# Patient Record
Sex: FEMALE | Race: WHITE | NOT HISPANIC OR LATINO | Employment: OTHER | URBAN - METROPOLITAN AREA
[De-identification: names, ages, dates, MRNs, and addresses within clinical notes are randomized per-mention and may not be internally consistent; named-entity substitution may affect disease eponyms.]

---

## 2017-02-06 ENCOUNTER — PATIENT MESSAGE (OUTPATIENT)
Dept: FAMILY MEDICINE | Facility: CLINIC | Age: 57
End: 2017-02-06

## 2017-02-08 ENCOUNTER — TELEPHONE (OUTPATIENT)
Dept: FAMILY MEDICINE | Facility: CLINIC | Age: 57
End: 2017-02-08

## 2017-02-08 ENCOUNTER — OFFICE VISIT (OUTPATIENT)
Dept: FAMILY MEDICINE | Facility: CLINIC | Age: 57
End: 2017-02-08
Payer: MEDICARE

## 2017-02-08 VITALS
BODY MASS INDEX: 26.35 KG/M2 | WEIGHT: 167.88 LBS | HEART RATE: 88 BPM | DIASTOLIC BLOOD PRESSURE: 88 MMHG | TEMPERATURE: 98 F | OXYGEN SATURATION: 98 % | HEIGHT: 67 IN | SYSTOLIC BLOOD PRESSURE: 110 MMHG

## 2017-02-08 DIAGNOSIS — F90.9 ATTENTION DEFICIT HYPERACTIVITY DISORDER (ADHD), UNSPECIFIED ADHD TYPE: ICD-10-CM

## 2017-02-08 DIAGNOSIS — Z12.11 SCREEN FOR COLON CANCER: Primary | ICD-10-CM

## 2017-02-08 DIAGNOSIS — K21.9 GASTROESOPHAGEAL REFLUX DISEASE WITHOUT ESOPHAGITIS: ICD-10-CM

## 2017-02-08 PROCEDURE — 99499 UNLISTED E&M SERVICE: CPT | Mod: S$GLB,,, | Performed by: FAMILY MEDICINE

## 2017-02-08 PROCEDURE — 99214 OFFICE O/P EST MOD 30 MIN: CPT | Mod: S$GLB,,, | Performed by: FAMILY MEDICINE

## 2017-02-08 PROCEDURE — 99999 PR PBB SHADOW E&M-EST. PATIENT-LVL III: CPT | Mod: PBBFAC,,, | Performed by: FAMILY MEDICINE

## 2017-02-08 RX ORDER — PANTOPRAZOLE SODIUM 20 MG/1
20 TABLET, DELAYED RELEASE ORAL DAILY
Qty: 90 TABLET | Refills: 1 | Status: SHIPPED | OUTPATIENT
Start: 2017-02-08 | End: 2018-03-15 | Stop reason: SDUPTHER

## 2017-02-08 RX ORDER — PANTOPRAZOLE SODIUM 40 MG/1
40 TABLET, DELAYED RELEASE ORAL DAILY
Qty: 30 TABLET | Refills: 5 | Status: CANCELLED | OUTPATIENT
Start: 2017-02-08

## 2017-02-08 RX ORDER — DEXTROAMPHETAMINE SACCHARATE, AMPHETAMINE ASPARTATE, DEXTROAMPHETAMINE SULFATE AND AMPHETAMINE SULFATE 3.75; 3.75; 3.75; 3.75 MG/1; MG/1; MG/1; MG/1
15 TABLET ORAL 2 TIMES DAILY
Qty: 60 TABLET | Refills: 0 | Status: SHIPPED | OUTPATIENT
Start: 2017-02-08 | End: 2017-04-12

## 2017-02-08 NOTE — PROGRESS NOTES
Subjective:       Patient ID: Nena Craig is a 56 y.o. female.    Chief Complaint: Medication Refill    HPI   Ms. Craig is a 55 yo female w/ a PMH of ADHD, hypothyroidism, anxiety, and trigeminal neuralgia who presented this AM for a routine f/u.  Pt c/o worsening of her GERD symptoms.  She requested to be placed back on prescription-strength pantoprazole, since she has been out of it for over a month now.  Additionally, she stated she is having occasional, severe HA associated w/ her trigeminal neuralgia. She is being followed by neurology.  Needs cheaper alternative to adderall xr while the patient assistance paperwork is processed for vyvanse.    Pt is otherwise well, overdue health maintenance was discussed and pt expressed understanding.    Review of Systems   Constitutional: Negative for activity change, appetite change, chills, diaphoresis, fatigue, fever and unexpected weight change.   HENT: Positive for sinus pressure. Negative for congestion, ear discharge, ear pain, facial swelling, postnasal drip, rhinorrhea, sore throat, tinnitus and trouble swallowing.    Eyes: Negative for photophobia, pain, discharge, redness, itching and visual disturbance.   Respiratory: Negative for apnea, cough, choking, chest tightness, shortness of breath, wheezing and stridor.    Cardiovascular: Negative for chest pain, palpitations and leg swelling.   Gastrointestinal: Positive for constipation. Negative for abdominal distention, abdominal pain, blood in stool, diarrhea, nausea and vomiting.   Genitourinary: Negative for difficulty urinating, dysuria, flank pain, hematuria and urgency.   Musculoskeletal: Negative for arthralgias, back pain, gait problem, joint swelling and myalgias.   Skin: Negative for color change, pallor, rash and wound.   Neurological: Positive for headaches. Negative for dizziness, facial asymmetry, speech difficulty, weakness, light-headedness and numbness.   Psychiatric/Behavioral: Negative for  agitation, behavioral problems, confusion, decreased concentration and dysphoric mood. The patient is not nervous/anxious.        Objective:      Physical Exam   Constitutional: She is oriented to person, place, and time. She appears well-developed and well-nourished. No distress.   HENT:   Head: Normocephalic and atraumatic.   Eyes: Conjunctivae and EOM are normal. Pupils are equal, round, and reactive to light.   Neck: Normal range of motion. Neck supple.   Cardiovascular: Normal rate, regular rhythm, normal heart sounds and intact distal pulses.    Pulmonary/Chest: Effort normal. No respiratory distress. She has no wheezes. She has no rales. She exhibits no tenderness.   Musculoskeletal: Normal range of motion. She exhibits no edema or deformity.   Neurological: She is alert and oriented to person, place, and time. No cranial nerve deficit.   Skin: Skin is warm and dry. She is not diaphoretic.   Psychiatric: She has a normal mood and affect. Her behavior is normal. Thought content normal.   Nursing note and vitals reviewed.        Screen for colon cancer  -     Case request GI: COLONOSCOPY    Gastroesophageal reflux disease without esophagitis  -     pantoprazole (PROTONIX) 20 MG tablet; Take 1 tablet (20 mg total) by mouth once daily.  Dispense: 90 tablet; Refill: 1  Trial at 20mg dose, if not sufficiently effective, can increase to 40mg daily.  Attention deficit hyperactivity disorder (ADHD), unspecified ADHD type  -     dextroamphetamine-amphetamine (ADDERALL) 15 mg tablet; Take 1 tablet (15 mg total) by mouth 2 (two) times daily.  Dispense: 60 tablet; Refill: 0  Side effects and precautions of medication use reviewed with patient, expressed understanding. No questions or concerns.     Risks, benefits, and limitations of all medications and interventions were discussed, pt expressed understanding.

## 2017-02-08 NOTE — TELEPHONE ENCOUNTER
----- Message from Jackelyn Graves sent at 2/8/2017 11:14 AM CST -----  Contact: [phone]  Patient called asking if the rx Adderall would be called into the pharmacy- [facility] on file.Thanks!

## 2017-02-22 ENCOUNTER — TELEPHONE (OUTPATIENT)
Dept: GASTROENTEROLOGY | Facility: CLINIC | Age: 57
End: 2017-02-22

## 2017-03-21 DIAGNOSIS — E03.4 HYPOTHYROIDISM DUE TO ACQUIRED ATROPHY OF THYROID: ICD-10-CM

## 2017-03-21 RX ORDER — LEVOTHYROXINE SODIUM 88 UG/1
88 TABLET ORAL DAILY
Qty: 30 TABLET | Refills: 3 | Status: SHIPPED | OUTPATIENT
Start: 2017-03-21 | End: 2017-08-05 | Stop reason: SDUPTHER

## 2017-04-12 ENCOUNTER — OFFICE VISIT (OUTPATIENT)
Dept: FAMILY MEDICINE | Facility: CLINIC | Age: 57
End: 2017-04-12
Payer: MEDICARE

## 2017-04-12 VITALS
SYSTOLIC BLOOD PRESSURE: 138 MMHG | OXYGEN SATURATION: 99 % | DIASTOLIC BLOOD PRESSURE: 87 MMHG | WEIGHT: 163.38 LBS | BODY MASS INDEX: 25.64 KG/M2 | TEMPERATURE: 98 F | HEART RATE: 83 BPM | HEIGHT: 67 IN

## 2017-04-12 DIAGNOSIS — F90.2 ATTENTION DEFICIT HYPERACTIVITY DISORDER (ADHD), COMBINED TYPE: Primary | ICD-10-CM

## 2017-04-12 PROCEDURE — 1160F RVW MEDS BY RX/DR IN RCRD: CPT | Mod: S$GLB,,, | Performed by: NURSE PRACTITIONER

## 2017-04-12 PROCEDURE — 99999 PR PBB SHADOW E&M-EST. PATIENT-LVL IV: CPT | Mod: PBBFAC,,, | Performed by: NURSE PRACTITIONER

## 2017-04-12 PROCEDURE — 99213 OFFICE O/P EST LOW 20 MIN: CPT | Mod: S$GLB,,, | Performed by: NURSE PRACTITIONER

## 2017-04-12 RX ORDER — DEXTROAMPHETAMINE SACCHARATE, AMPHETAMINE ASPARTATE MONOHYDRATE, DEXTROAMPHETAMINE SULFATE AND AMPHETAMINE SULFATE 7.5; 7.5; 7.5; 7.5 MG/1; MG/1; MG/1; MG/1
30 CAPSULE, EXTENDED RELEASE ORAL EVERY MORNING
Qty: 30 CAPSULE | Refills: 0 | Status: SHIPPED | OUTPATIENT
Start: 2017-06-12 | End: 2017-07-12

## 2017-04-12 RX ORDER — DEXTROAMPHETAMINE SACCHARATE, AMPHETAMINE ASPARTATE MONOHYDRATE, DEXTROAMPHETAMINE SULFATE AND AMPHETAMINE SULFATE 7.5; 7.5; 7.5; 7.5 MG/1; MG/1; MG/1; MG/1
30 CAPSULE, EXTENDED RELEASE ORAL EVERY MORNING
COMMUNITY
End: 2017-04-12 | Stop reason: SDUPTHER

## 2017-04-12 RX ORDER — DEXTROAMPHETAMINE SACCHARATE, AMPHETAMINE ASPARTATE MONOHYDRATE, DEXTROAMPHETAMINE SULFATE AND AMPHETAMINE SULFATE 7.5; 7.5; 7.5; 7.5 MG/1; MG/1; MG/1; MG/1
30 CAPSULE, EXTENDED RELEASE ORAL EVERY MORNING
Qty: 30 CAPSULE | Refills: 0 | Status: SHIPPED | OUTPATIENT
Start: 2017-04-12 | End: 2017-08-07 | Stop reason: SDUPTHER

## 2017-04-12 RX ORDER — DEXTROAMPHETAMINE SACCHARATE, AMPHETAMINE ASPARTATE MONOHYDRATE, DEXTROAMPHETAMINE SULFATE AND AMPHETAMINE SULFATE 7.5; 7.5; 7.5; 7.5 MG/1; MG/1; MG/1; MG/1
30 CAPSULE, EXTENDED RELEASE ORAL EVERY MORNING
Qty: 30 CAPSULE | Refills: 0 | Status: SHIPPED | OUTPATIENT
Start: 2017-05-12 | End: 2017-06-11

## 2017-04-12 NOTE — PROGRESS NOTES
Subjective:       Patient ID: Nena Craig is a 57 y.o. female.    Chief Complaint: Medication Refill    HPI     Patient presents to clinic for routine follow-up.  She has a hx of ADD. She is established with Dr. Ballard with psychiatry, was formerly maintained on Vyvanse 70 mg daily. Insurance is not covering medication. She is actively pursuing patient assistance. She was placed on adderall xr by Dr. Ballard, which was expensive. She was then evaluated by Dr. Monahan for a cheaper alternative, she tried short acting adderall BID but felt as though the medication was not effective.     reviewed and is consistent.     Review of Systems   Constitutional: Negative for chills and fever.   Respiratory: Negative for cough, shortness of breath and wheezing.    Cardiovascular: Negative for chest pain and palpitations.   Gastrointestinal: Negative for blood in stool, diarrhea, nausea and vomiting.   Psychiatric/Behavioral: Negative for dysphoric mood, self-injury, sleep disturbance and suicidal ideas. The patient is not nervous/anxious.        Objective:      Physical Exam   Constitutional: She is oriented to person, place, and time. She appears well-developed and well-nourished.   HENT:   Head: Normocephalic and atraumatic.   Cardiovascular: Normal rate, regular rhythm and normal heart sounds.    No murmur heard.  Pulmonary/Chest: Effort normal and breath sounds normal. No respiratory distress. She has no wheezes. She has no rales.   Musculoskeletal: Normal range of motion. She exhibits no edema, tenderness or deformity.   Neurological: She is alert and oriented to person, place, and time. No cranial nerve deficit.   Skin: Skin is warm and dry.   Psychiatric: She has a normal mood and affect. Her behavior is normal.   Nursing note and vitals reviewed.      Assessment:       1. Attention deficit hyperactivity disorder (ADHD), combined type        Plan:   Nena was seen today for medication refill.    Diagnoses and all  orders for this visit:    Attention deficit hyperactivity disorder (ADHD), combined type  -     dextroamphetamine-amphetamine (ADDERALL XR) 30 MG 24 hr capsule; Take 1 capsule (30 mg total) by mouth every morning.  -     dextroamphetamine-amphetamine (ADDERALL XR) 30 MG 24 hr capsule; Take 1 capsule (30 mg total) by mouth every morning.  -     dextroamphetamine-amphetamine (ADDERALL XR) 30 MG 24 hr capsule; Take 1 capsule (30 mg total) by mouth every morning.  3 months of rx printed. Medication compliance, sx monitoring, prescription responsibility and non-diversion reviewed. Patient aware that I do not provide reprints for lost rx. Had no questions or concerns.

## 2017-04-19 ENCOUNTER — TELEPHONE (OUTPATIENT)
Dept: NEUROLOGY | Facility: CLINIC | Age: 57
End: 2017-04-19

## 2017-04-19 DIAGNOSIS — G50.0 IDIOPATHIC TRIGEMINAL NEURALGIA: ICD-10-CM

## 2017-04-19 RX ORDER — GABAPENTIN 600 MG/1
600 TABLET ORAL 3 TIMES DAILY
Qty: 90 TABLET | Refills: 1 | Status: SHIPPED | OUTPATIENT
Start: 2017-04-19 | End: 2017-07-14 | Stop reason: SDUPTHER

## 2017-04-19 NOTE — TELEPHONE ENCOUNTER
Lm for patient to contact our office to discuss scheduling appointment in order to continue to get refills of Neurontin. Last appointment was April of 2016, and two refills have been given as of today for this medication.

## 2017-07-14 ENCOUNTER — OFFICE VISIT (OUTPATIENT)
Dept: NEUROLOGY | Facility: CLINIC | Age: 57
End: 2017-07-14
Payer: MEDICARE

## 2017-07-14 VITALS
SYSTOLIC BLOOD PRESSURE: 138 MMHG | HEIGHT: 67 IN | DIASTOLIC BLOOD PRESSURE: 86 MMHG | WEIGHT: 165.5 LBS | BODY MASS INDEX: 25.98 KG/M2 | HEART RATE: 92 BPM

## 2017-07-14 DIAGNOSIS — G50.0 IDIOPATHIC TRIGEMINAL NEURALGIA: Primary | ICD-10-CM

## 2017-07-14 DIAGNOSIS — J34.9 PARANASAL SINUS DISEASE: ICD-10-CM

## 2017-07-14 PROCEDURE — 99214 OFFICE O/P EST MOD 30 MIN: CPT | Mod: S$GLB,,, | Performed by: PSYCHIATRY & NEUROLOGY

## 2017-07-14 PROCEDURE — 99999 PR PBB SHADOW E&M-EST. PATIENT-LVL III: CPT | Mod: PBBFAC,,, | Performed by: PSYCHIATRY & NEUROLOGY

## 2017-07-14 RX ORDER — PREGABALIN 75 MG/1
75 CAPSULE ORAL 2 TIMES DAILY
Qty: 60 CAPSULE | Refills: 1 | Status: SHIPPED | OUTPATIENT
Start: 2017-07-14 | End: 2017-08-07

## 2017-07-14 RX ORDER — GABAPENTIN 600 MG/1
600 TABLET ORAL 3 TIMES DAILY
Qty: 90 TABLET | Refills: 1 | Status: SHIPPED | OUTPATIENT
Start: 2017-07-14 | End: 2018-03-12 | Stop reason: SDUPTHER

## 2017-07-14 NOTE — PROGRESS NOTES
Subjective:       Patient ID: Nena Craig is a 57 y.o. female.    Chief Complaint:  Facial Pain      History of Present Illness  HPI   This is a 57-year-old female who had been seen by me for evaluation of intermittent numbness and tingling and burning sensation in the left face predominantly in the cheek and upper jaw that has been going on for a couple of years. She first had problems in October 2012 and was seen at the [facility] emergency room. A CT scan of the brain done at that time was unremarkable. She was also having pain around the left ear with occasional pain going down to the left shoulder and arm. The patient did well subsequently however a month later he had recurrence of pain and then back to the emergency room. She subsequently had an episode in January 2013 with associated ringing in the left ear. Since then the symptoms had been intermittent usually lasting for a day and coming on every few days.  She was started on gabapentin 300 mg daily in the dosage of which was gradually increased to 3 times a day with significant improvement in symptoms.   However over the past several months the gabapentin has not been helping.  She was tried on Cymbalta however weight gain was a problem. She does report that triggers include barometric pressure changes, sinus problems, stress/anxiety and exposure to cold.  She does report that she has been having some sinus problems recently related to the weather changes.      She did see an ENT, Dr. Jamin Fitch, in the past with audiological studies being normal. The patient denies any hearing impairment. She had also seen a dentist and had a tooth removed in the left upper jaw and felt a little better for about a week while she was being treated with antibiotics. However the symptoms gradually returned. An MRI scan of the brain and posterior fossa, with and without contrast, done on 09/26/2014, was essentially normal.       Review of Systems  Review of  Systems   Constitutional: Negative.    HENT: Negative.  Negative for hearing loss.    Eyes: Negative.  Negative for visual disturbance.   Respiratory: Negative.  Negative for shortness of breath.    Cardiovascular: Negative.  Negative for chest pain and palpitations.   Gastrointestinal: Negative.    Endocrine: Negative.    Genitourinary: Negative.    Musculoskeletal: Positive for neck pain. Negative for back pain and gait problem.   Skin: Negative.    Allergic/Immunologic: Negative.    Neurological: Positive for numbness. Negative for tremors, seizures, syncope, speech difficulty, weakness and headaches.   Psychiatric/Behavioral: Negative for confusion and decreased concentration. The patient is nervous/anxious.        Objective:      Neurologic Exam      Physical Exam   Constitutional: She appears well-developed and well-nourished.   HENT:   Head: Normocephalic and atraumatic.   Right Ear: Hearing normal.   Left Ear: Hearing normal.   Eyes:   Fundus examination showed sharp disc margins.   Neck: Normal range of motion. Neck supple. Muscular tenderness (paraspinal muscle tenderness predominantly on the left) present. Carotid bruit is not present.   Neurological: She is alert. She has normal reflexes. She displays no atrophy. A cranial nerve deficit (Visual fields at bedside testing essentially normal.  No facial asymmetry noted with facial movements and sensory exam being normal/symmetrical.  Corneals/gag reflexes normal.  Tongue & palate movements normal.  Shoulder shrug was normal.) is present. No sensory deficit. She exhibits normal muscle tone. She displays a negative Romberg sign. Coordination and gait normal.   Mental status examination: Patient is fully oriented and able to give an adequate history.  Recall of recent and past information is good.  Immediate recall is normal.  Attention span and concentration was normal.  Judgment and insight is normal.  Language functions are intact with no evidence of  aphasia or dysarthria.  Comprehension is unimpaired.  Affect is appropriate, Mood was anxious and speech was a little pressured.  No thought disorder is noted.         Assessment:        1. Idiopathic trigeminal neuralgia    2. Paranasal sinus disease             Plan:       Discussed with patient.  Continue gabapentin at its present dosage.  Trial with Lyrica 75 mg twice a day.  Will repeat the MRI scan of the brain, noncontrast and refer her to an [facility] for an evaluation..  We will contact him with results of the MRI scan.  She will follow-up in 6 months if stable.

## 2017-07-17 ENCOUNTER — TELEPHONE (OUTPATIENT)
Dept: NEUROLOGY | Facility: CLINIC | Age: 57
End: 2017-07-17

## 2017-07-17 NOTE — TELEPHONE ENCOUNTER
----- Message from Porfirio Cesar sent at 7/17/2017  3:36 PM CDT -----  Contact: pt  x_ 1st Request   _ 2nd Request   _ 3rd Request     Who: JOHN BENSON [[id number]]    Why: Pt missed your call is requesting a call back    What Number to Call Back: [phone]    When to Expect a call back: (Before the end of the day)   -- if call after 3:00 call back will be tomorrow.

## 2017-07-20 ENCOUNTER — HOSPITAL ENCOUNTER (OUTPATIENT)
Dept: RADIOLOGY | Facility: HOSPITAL | Age: 57
Discharge: HOME | End: 2017-07-20
Attending: PSYCHIATRY & NEUROLOGY
Payer: MEDICARE

## 2017-07-20 DIAGNOSIS — G50.0 IDIOPATHIC TRIGEMINAL NEURALGIA: ICD-10-CM

## 2017-07-20 PROCEDURE — A9585 GADOBUTROL INJECTION: HCPCS | Mod: PO | Performed by: PSYCHIATRY & NEUROLOGY

## 2017-07-20 PROCEDURE — 25500020 PHARM REV CODE 255: Mod: PO | Performed by: PSYCHIATRY & NEUROLOGY

## 2017-07-20 PROCEDURE — 70553 MRI BRAIN STEM W/O & W/DYE: CPT | Mod: 26,,, | Performed by: RADIOLOGY

## 2017-07-20 PROCEDURE — 70553 MRI BRAIN STEM W/O & W/DYE: CPT | Mod: TC,PO

## 2017-07-20 RX ORDER — GADOBUTROL 604.72 MG/ML
7 INJECTION INTRAVENOUS
Status: COMPLETED | OUTPATIENT
Start: 2017-07-20 | End: 2017-07-20

## 2017-07-20 RX ADMIN — GADOBUTROL 7 ML: 604.72 INJECTION INTRAVENOUS at 05:07

## 2017-08-05 DIAGNOSIS — E03.4 HYPOTHYROIDISM DUE TO ACQUIRED ATROPHY OF THYROID: ICD-10-CM

## 2017-08-05 RX ORDER — LEVOTHYROXINE SODIUM 88 UG/1
TABLET ORAL
Qty: 30 TABLET | Refills: 3 | Status: SHIPPED | OUTPATIENT
Start: 2017-08-05 | End: 2017-11-14 | Stop reason: SDUPTHER

## 2017-08-07 ENCOUNTER — OFFICE VISIT (OUTPATIENT)
Dept: FAMILY MEDICINE | Facility: CLINIC | Age: 57
End: 2017-08-07
Payer: MEDICARE

## 2017-08-07 VITALS
TEMPERATURE: 98 F | HEIGHT: 67 IN | WEIGHT: 163.81 LBS | DIASTOLIC BLOOD PRESSURE: 80 MMHG | HEART RATE: 87 BPM | OXYGEN SATURATION: 97 % | BODY MASS INDEX: 25.71 KG/M2 | SYSTOLIC BLOOD PRESSURE: 116 MMHG

## 2017-08-07 DIAGNOSIS — F90.2 ATTENTION DEFICIT HYPERACTIVITY DISORDER (ADHD), COMBINED TYPE: Primary | ICD-10-CM

## 2017-08-07 DIAGNOSIS — Z79.899 LONG-TERM USE OF HIGH-RISK MEDICATION: ICD-10-CM

## 2017-08-07 DIAGNOSIS — G50.0 IDIOPATHIC TRIGEMINAL NEURALGIA: ICD-10-CM

## 2017-08-07 DIAGNOSIS — Z13.6 SCREENING FOR HEART DISEASE: ICD-10-CM

## 2017-08-07 DIAGNOSIS — L23.9 ALLERGIC CONTACT DERMATITIS, UNSPECIFIED TRIGGER: ICD-10-CM

## 2017-08-07 DIAGNOSIS — Z12.31 ENCOUNTER FOR SCREENING MAMMOGRAM FOR BREAST CANCER: ICD-10-CM

## 2017-08-07 DIAGNOSIS — N30.00 ACUTE CYSTITIS WITHOUT HEMATURIA: ICD-10-CM

## 2017-08-07 DIAGNOSIS — F41.9 ANXIETY DISORDER, UNSPECIFIED TYPE: ICD-10-CM

## 2017-08-07 PROCEDURE — 99499 UNLISTED E&M SERVICE: CPT | Mod: S$GLB,,, | Performed by: NURSE PRACTITIONER

## 2017-08-07 PROCEDURE — 3008F BODY MASS INDEX DOCD: CPT | Mod: S$GLB,,, | Performed by: NURSE PRACTITIONER

## 2017-08-07 PROCEDURE — 99999 PR PBB SHADOW E&M-EST. PATIENT-LVL V: CPT | Mod: PBBFAC,,, | Performed by: NURSE PRACTITIONER

## 2017-08-07 PROCEDURE — 99214 OFFICE O/P EST MOD 30 MIN: CPT | Mod: S$GLB,,, | Performed by: NURSE PRACTITIONER

## 2017-08-07 RX ORDER — DEXTROAMPHETAMINE SACCHARATE, AMPHETAMINE ASPARTATE MONOHYDRATE, DEXTROAMPHETAMINE SULFATE AND AMPHETAMINE SULFATE 7.5; 7.5; 7.5; 7.5 MG/1; MG/1; MG/1; MG/1
30 CAPSULE, EXTENDED RELEASE ORAL EVERY MORNING
Qty: 30 CAPSULE | Refills: 0 | Status: SHIPPED | OUTPATIENT
Start: 2017-10-07 | End: 2017-11-14 | Stop reason: SDUPTHER

## 2017-08-07 RX ORDER — DEXTROAMPHETAMINE SACCHARATE, AMPHETAMINE ASPARTATE MONOHYDRATE, DEXTROAMPHETAMINE SULFATE AND AMPHETAMINE SULFATE 7.5; 7.5; 7.5; 7.5 MG/1; MG/1; MG/1; MG/1
30 CAPSULE, EXTENDED RELEASE ORAL EVERY MORNING
Qty: 30 CAPSULE | Refills: 0 | Status: ON HOLD | OUTPATIENT
Start: 2017-08-07 | End: 2017-08-31 | Stop reason: CLARIF

## 2017-08-07 RX ORDER — TRIAMCINOLONE ACETONIDE 1 MG/G
CREAM TOPICAL 2 TIMES DAILY
Qty: 1 TUBE | Refills: 0 | Status: SHIPPED | OUTPATIENT
Start: 2017-08-07 | End: 2019-11-19

## 2017-08-07 RX ORDER — SULFAMETHOXAZOLE AND TRIMETHOPRIM 800; 160 MG/1; MG/1
1 TABLET ORAL 2 TIMES DAILY
Qty: 14 TABLET | Refills: 0 | Status: SHIPPED | OUTPATIENT
Start: 2017-08-07 | End: 2017-08-14

## 2017-08-07 RX ORDER — DEXTROAMPHETAMINE SACCHARATE, AMPHETAMINE ASPARTATE MONOHYDRATE, DEXTROAMPHETAMINE SULFATE AND AMPHETAMINE SULFATE 7.5; 7.5; 7.5; 7.5 MG/1; MG/1; MG/1; MG/1
30 CAPSULE, EXTENDED RELEASE ORAL EVERY MORNING
Qty: 30 CAPSULE | Refills: 0 | Status: ON HOLD | OUTPATIENT
Start: 2017-09-07 | End: 2017-08-31 | Stop reason: CLARIF

## 2017-08-07 NOTE — PROGRESS NOTES
Subjective:       Patient ID: Nena Craig is a 57 y.o. female.    Chief Complaint: Follow-up; Urinary Tract Infection (pt is c/o uti flank pain, dysuria and urinary frequency x1 week. AZO twice with no relief. ); and Nail Problem (left foot and rash. )    HPI     Patient is here today to review treatment for ADD using stimulant medication.  They presently have no new concerns  The medication is effective without adverse side effects  The patient reports good control of concentration and attention.  Completing appropriate daily tasks without difficulty   reviewed and is consistent.     Trigeminal neuralgia - established with neurology, Dr. Shine - MRI updated 07/20/2017. She has been referred to ENT at [facility] for persistent AR sx.     Urinary sx - dysuria, frequency and flank pain x 1 week. Azo has not provided any relief. Denies fever, chills. She has noted some flank pain.     She is due for repeat colonoscopy - will schedule f/u with Dr. Lopez.     Rash to left foot - she has been applying neosporin, Dr. Cohen's, alcohol, and metrogel without relief of sx.     Review of Systems   Constitutional: Negative for chills and fever.   HENT: Positive for congestion, postnasal drip, rhinorrhea and sinus pressure.    Gastrointestinal: Negative for nausea and vomiting.   Genitourinary: Positive for dysuria, flank pain, frequency and urgency. Negative for difficulty urinating and hematuria.   Skin: Positive for rash. Negative for wound.   Allergic/Immunologic: Positive for environmental allergies. Negative for food allergies.   Psychiatric/Behavioral: Negative for dysphoric mood and sleep disturbance. The patient is not nervous/anxious.        Objective:      Physical Exam   Constitutional: She is oriented to person, place, and time. She appears well-developed and well-nourished. No distress.   HENT:   Head: Normocephalic and atraumatic.   Cardiovascular: Normal rate, regular rhythm and normal heart sounds.   Exam reveals no gallop and no friction rub.    No murmur heard.  Pulmonary/Chest: Effort normal and breath sounds normal. No respiratory distress. She has no wheezes. She has no rales.   Abdominal: Soft. Bowel sounds are normal. There is no tenderness. There is no CVA tenderness.   Musculoskeletal: Normal range of motion. She exhibits no edema, tenderness or deformity.   Neurological: She is alert and oriented to person, place, and time. No cranial nerve deficit. Coordination normal.   Skin: Skin is warm and dry. No rash noted. She is not diaphoretic. No erythema.        Psychiatric: She has a normal mood and affect. Her behavior is normal.   Nursing note and vitals reviewed.      Assessment:       1. Attention deficit hyperactivity disorder (ADHD), combined type    2. Idiopathic trigeminal neuralgia    3. Acute cystitis without hematuria    4. Allergic contact dermatitis, unspecified trigger    5. Long-term use of high-risk medication    6. Screening for heart disease    7. Vitamin D deficiency    8. Anxiety disorder, unspecified type    9. Encounter for screening mammogram for breast cancer        Plan:   Nena was seen today for follow-up, urinary tract infection and nail problem.    Diagnoses and all orders for this visit:    Attention deficit hyperactivity disorder (ADHD), combined type  -     dextroamphetamine-amphetamine (ADDERALL XR) 30 MG 24 hr capsule; Take 1 capsule (30 mg total) by mouth every morning.  -     dextroamphetamine-amphetamine (ADDERALL XR) 30 MG 24 hr capsule; Take 1 capsule (30 mg total) by mouth every morning.  -     dextroamphetamine-amphetamine (ADDERALL XR) 30 MG 24 hr capsule; Take 1 capsule (30 mg total) by mouth every morning.  3 months of rx printed. Medication compliance, sx monitoring, prescription responsibility and non-diversion reviewed. Patient aware that I do not provide reprints for lost rx. Had no questions or concerns.    Idiopathic trigeminal neuralgia  Continue current  regimen and neurology f/u.     Acute cystitis without hematuria  -     sulfamethoxazole-trimethoprim 800-160mg (BACTRIM DS) 800-160 mg Tab; Take 1 tablet by mouth 2 (two) times daily. Side effects and precautions of medication use reviewed with patient, expressed understanding. No questions or concerns.    Allergic contact dermatitis, unspecified trigger  -     triamcinolone acetonide 0.1% (KENALOG) 0.1 % cream; Apply topically 2 (two) times daily.  D/C use of otc products. Trial of topical steroid. RTC if sx worsen or fail to improve.     Long-term use of high-risk medication  -     Comprehensive metabolic panel; Future  -     CBC auto differential; Future  -     Vitamin D; Future  -     Hemoglobin A1c; Future    Screening for heart disease  -     Lipid panel; Future    Anxiety disorder, unspecified type  Self-care, sx monitoring, and counseling reviewed. Patient receptive to discussion.   Continue psych f/u.     Encounter for screening mammogram for breast cancer  -     Mammo Digital Screening Bilateral With CAD; Future

## 2017-08-09 ENCOUNTER — TELEPHONE (OUTPATIENT)
Dept: GASTROENTEROLOGY | Facility: CLINIC | Age: 57
End: 2017-08-09

## 2017-08-09 NOTE — TELEPHONE ENCOUNTER
----- Message from Aiyana Lehman sent at 8/8/2017  3:42 PM CDT -----  Contact: self [phone]  Call placed to pod. Patient returned your call, please call back.  Thank you!

## 2017-08-14 ENCOUNTER — TELEPHONE (OUTPATIENT)
Dept: GASTROENTEROLOGY | Facility: CLINIC | Age: 57
End: 2017-08-14

## 2017-08-14 NOTE — TELEPHONE ENCOUNTER
----- Message from Nick Dobbs sent at 8/14/2017  1:51 PM CDT -----  Contact: self   Placed call to pod, patient miss call from your office please call back at [phone] ([email or web address])

## 2017-08-18 ENCOUNTER — CLINICAL SUPPORT (OUTPATIENT)
Dept: AUDIOLOGY | Facility: CLINIC | Age: 57
End: 2017-08-18
Payer: MEDICARE

## 2017-08-18 ENCOUNTER — OFFICE VISIT (OUTPATIENT)
Dept: OTOLARYNGOLOGY | Facility: CLINIC | Age: 57
End: 2017-08-18
Payer: MEDICARE

## 2017-08-18 VITALS
SYSTOLIC BLOOD PRESSURE: 136 MMHG | WEIGHT: 164 LBS | TEMPERATURE: 100 F | DIASTOLIC BLOOD PRESSURE: 91 MMHG | HEIGHT: 67 IN | BODY MASS INDEX: 25.74 KG/M2 | HEART RATE: 96 BPM

## 2017-08-18 DIAGNOSIS — Z98.890 S/P SINUS SURGERY: ICD-10-CM

## 2017-08-18 DIAGNOSIS — J34.89 CONCHA BULLOSA: ICD-10-CM

## 2017-08-18 DIAGNOSIS — Z91.09 HISTORY OF AIRBORNE ALLERGIES: ICD-10-CM

## 2017-08-18 DIAGNOSIS — H90.5 HIGH FREQUENCY SENSORINEURAL HEARING LOSS OF LEFT EAR: ICD-10-CM

## 2017-08-18 DIAGNOSIS — K08.409 H/O TOOTH EXTRACTION, UNSPECIFIED EDENTULISM: ICD-10-CM

## 2017-08-18 DIAGNOSIS — H90.3 BILATERAL HIGH FREQUENCY SENSORINEURAL HEARING LOSS: Primary | ICD-10-CM

## 2017-08-18 DIAGNOSIS — J34.2 NASAL SEPTAL DEVIATION: Primary | ICD-10-CM

## 2017-08-18 DIAGNOSIS — G50.0 TRIGEMINAL NEURALGIA OF LEFT SIDE OF FACE: ICD-10-CM

## 2017-08-18 DIAGNOSIS — H92.02 EAR DISCOMFORT, LEFT: ICD-10-CM

## 2017-08-18 PROCEDURE — 92567 TYMPANOMETRY: CPT | Mod: S$GLB,,, | Performed by: AUDIOLOGIST

## 2017-08-18 PROCEDURE — 99999 PR PBB SHADOW E&M-EST. PATIENT-LVL IV: CPT | Mod: PBBFAC,,, | Performed by: OTOLARYNGOLOGY

## 2017-08-18 PROCEDURE — 92557 COMPREHENSIVE HEARING TEST: CPT | Mod: S$GLB,,, | Performed by: AUDIOLOGIST

## 2017-08-18 PROCEDURE — 3008F BODY MASS INDEX DOCD: CPT | Mod: S$GLB,,, | Performed by: OTOLARYNGOLOGY

## 2017-08-18 PROCEDURE — 99999 PR PBB SHADOW E&M-EST. PATIENT-LVL I: CPT | Mod: PBBFAC,,,

## 2017-08-18 PROCEDURE — 99214 OFFICE O/P EST MOD 30 MIN: CPT | Mod: S$GLB,,, | Performed by: OTOLARYNGOLOGY

## 2017-08-18 PROCEDURE — 99499 UNLISTED E&M SERVICE: CPT | Mod: S$GLB,,, | Performed by: OTOLARYNGOLOGY

## 2017-08-18 NOTE — PATIENT INSTRUCTIONS
Audiometry reviewed: AS high frequency SNHL  Use of Mauri Med rinse kit with distilled water may help cleanse sinuses ; instucion sheets provided  I recommend consultation with Dr. DEXTER Bailey re: left infra-orbital pain Rx options  I recommend allergy consultation with Dr. DEXTER Arreola ; ordered  Repeat sinus CT ordered

## 2017-08-18 NOTE — LETTER
August 19, 2017      Ryley Shine MD  [address]  [address]  [facility] [address]           [facility] - Otorhinolaryngology  [address]  [facility] [address]  Phone: [phone]  Fax: [phone]          Patient: Nena Craig   MR Number: [id number]   YOB: 1960   Date of Visit: 8/18/2017       Dear Dr. Ryley Shine:    Thank you for referring Nena Craig to me for evaluation. Attached you will find relevant portions of my assessment and plan of care.    If you have questions, please do not hesitate to call me. I look forward to following Nena Craig along with you.    Sincerely,    Christo Lo III, MD    Enclosure  CC:  No Recipients    If you would like to receive this communication electronically, please contact [email or web address] or [phone] to request more information on [employer] Link access.    For providers and/or their staff who would like to refer a patient to [facility], please contact us through our one-stop-shop provider referral line, [facility], at [phone].    If you feel you have received this communication in error or would no longer like to receive these types of communications, please e-mail [email or web address]

## 2017-08-18 NOTE — PROGRESS NOTES
Ms. Craig was seen in the clinic today for a hearing evaluation.     Audiological testing revealed a mild high frequency hearing loss at 8 kHz in the right ear and a mild to moderate high frequency hearing loss from 4-8 kHz in the left ear. A speech reception threshold was obtained at 10 dBHL in the right ear and 15 dBHL in the left ear. Speech discrimination was 92%, bilaterally.    Tympanometry revealed Type A tympanograms, bilaterally.    Recommendations:  1. Otologic evaluation  2. Annual hearing evaluation  3. Noise protection

## 2017-08-18 NOTE — PROGRESS NOTES
"Subjective:       Patient ID: Nena Craig is a 57 y.o. female.    Chief Complaint: No chief complaint on file.    HPI: Ms. Craig is a 57 year old blonde CF who lives in [place], [place].  Her primary complaint is left infraorbital pain and discomfort.  She has been diagnosed with left trigeminal neuralgia in 2015 treated with gabapentin.  She is status post 2 previous sinus surgeries, the first was performed by Dr. Isreal Pan and the second was performed by Dr. Eduard Saldana 10 years ago.  She indicates a left upper molar dental extraction procedure (which  Did not appear to help her left facial symptoms).  She indicates left infraorbital swelling at times.    She was worked up for ALLERGIES by Dr. Saldana and she remembers positive responses to trees and grasses i.e. 7 different allergens.  She never received immunotherapy..  [place].  She indicates left ear pain symptoms which she possibly relates to her neuralgia or sinus related symptoms.    She has been told by the neurology that certain physical stimuli may cause trigeminal neuralgia exacerbation including rain( barometric pressure change) and certain allergens (such as hydrangea plants) .  She is interested in ALLERGY testing if it may help control her symptoms.    The patient is status post a 7/21/2017  MRI scan of the brain as part of her workup for trigeminal neuralgia per Dr. Scott.  There appeared to be evidence of a 5 mm pituitary adenoma.  There were no masses noted along the cerebellar pontine angles there was no abnormal enhancement noted of the fifth cranial nerves specifically.  The vertebral arteries appeared ectatic with both traveling on the right side of the brain stem.  It could be contact with the origin of the ninth cranial nerve on the right side.  She also completed a sinus CT in July 2015 which indicated a leftward septal deviation a right seferino bullosa deformity as well as evidence " of previous ostiomeatal enlargement procedures    PMH: Thyroid disease  PSH: Sinus surgery 1991 (Dr. Pan); sinus surgery 2006 (Dr. Saldana)  Family history: Heart disease, high cholesterol, thyroid disease, prostate cancer  ALLERGIES: ?dust mites ?  Habits: 2 caffeinated drinks per day  Occupation: Former ; retired  Review of Systems   Ears: Positive for ear pain, ear pressure, ringing in ear, ear discharge, ear infections and family history of hearing loss.    Mouth/Throat: Positive for pain swallowing and impaired swallowing.   Constitutional: Positive for fever, chills and night sweats.    Eyes:  Positive for visual change.   Gastrointestinal:  Positive for acid reflux.   Other:  Positive for rash, depression, anxiety, heat intolerance and cold intolerance.         The patient completed an audiometric study performed by the [facility] audiology service.  The studies duplicated below and the results are reviewed with the patient  Objective:           Blood pressure 136/91 pulse 91 temperature 99.7 height 5 feet 7 inches weight 164 pounds  Gen.: Alert and oriented lady in no acute distress  Physical Exam   Constitutional: She is oriented to person, place, and time. She appears well-developed and well-nourished.   HENT:   Head: Normocephalic.       Right Ear: Tympanic membrane and external ear normal. No drainage. No foreign bodies. No mastoid tenderness. Tympanic membrane is not perforated. No decreased hearing is noted.   Left Ear: Tympanic membrane and external ear normal. No drainage. No foreign bodies. No mastoid tenderness. Tympanic membrane is not perforated. No decreased hearing is noted.   Ears:    Nose: Nose normal. No nasal deformity, septal deviation or nasal septal hematoma. No epistaxis. Right sinus exhibits no maxillary sinus tenderness and no frontal sinus tenderness. Left sinus exhibits no maxillary sinus tenderness and no frontal sinus tenderness.       Mouth/Throat:  Uvula is midline, oropharynx is clear and moist and mucous membranes are normal. No oral lesions. No trismus in the jaw. No uvula swelling. No oropharyngeal exudate or tonsillar abscesses.   Neck: Neck supple. No tracheal deviation present. No thyromegaly present.   Pulmonary/Chest: Effort normal. No stridor.   Lymphadenopathy:     She has no cervical adenopathy.   Neurological: She is alert and oriented to person, place, and time.   Skin: Rash noted.       Assessment:       1. Nasal septal deviation    2. S/P sinus surgery    3. Trigeminal neuralgia of left side of face    4. History of airborne allergies    5. Casie bullosa    6. Ear discomfort, left    7. H/O tooth extraction, unspecified edentulism; left upper first molar    8. 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    5 mm pituitary adenoma per MRI   Plan:     Audiometry reviewed: AS high frequency SNHL  Use of Mauri Med rinse kit with distilled water may help cleanse sinuses ; instucion sheets provided  I recommend consultation with Dr. DEXTER Bailey re: left infra-orbital pain Rx options  I recommend allergy consultation with Dr. DEXTER Arreola ; ordered  Repeat sinus CT ordered

## 2017-08-24 ENCOUNTER — HOSPITAL ENCOUNTER (OUTPATIENT)
Dept: RADIOLOGY | Facility: HOSPITAL | Age: 57
Discharge: HOME | End: 2017-08-24
Attending: NURSE PRACTITIONER
Payer: MEDICARE

## 2017-08-24 ENCOUNTER — HOSPITAL ENCOUNTER (OUTPATIENT)
Dept: RADIOLOGY | Facility: HOSPITAL | Age: 57
Discharge: HOME | End: 2017-08-24
Attending: OTOLARYNGOLOGY
Payer: MEDICARE

## 2017-08-24 DIAGNOSIS — G50.0 TRIGEMINAL NEURALGIA OF LEFT SIDE OF FACE: ICD-10-CM

## 2017-08-24 DIAGNOSIS — Z98.890 S/P SINUS SURGERY: ICD-10-CM

## 2017-08-24 DIAGNOSIS — J34.2 NASAL SEPTAL DEVIATION: ICD-10-CM

## 2017-08-24 DIAGNOSIS — Z12.31 ENCOUNTER FOR SCREENING MAMMOGRAM FOR BREAST CANCER: ICD-10-CM

## 2017-08-24 DIAGNOSIS — K08.409 H/O TOOTH EXTRACTION, UNSPECIFIED EDENTULISM: ICD-10-CM

## 2017-08-24 PROCEDURE — 77067 SCR MAMMO BI INCL CAD: CPT | Mod: 26,,, | Performed by: RADIOLOGY

## 2017-08-24 PROCEDURE — 77067 SCR MAMMO BI INCL CAD: CPT | Mod: TC

## 2017-08-24 PROCEDURE — 70486 CT MAXILLOFACIAL W/O DYE: CPT | Mod: TC,PO

## 2017-08-24 PROCEDURE — 70486 CT MAXILLOFACIAL W/O DYE: CPT | Mod: 26,,, | Performed by: RADIOLOGY

## 2017-08-24 PROCEDURE — 77063 BREAST TOMOSYNTHESIS BI: CPT | Mod: 26,,, | Performed by: RADIOLOGY

## 2017-08-28 ENCOUNTER — TELEPHONE (OUTPATIENT)
Dept: OTOLARYNGOLOGY | Facility: CLINIC | Age: 57
End: 2017-08-28

## 2017-08-28 NOTE — TELEPHONE ENCOUNTER
Left message on voicemail for pt to call back when received message in regards to rescheduling appointment with Dr. Dominguez on 09/12/17.

## 2017-08-31 ENCOUNTER — SURGERY (OUTPATIENT)
Age: 57
End: 2017-08-31

## 2017-08-31 ENCOUNTER — ANESTHESIA EVENT (OUTPATIENT)
Dept: ENDOSCOPY | Facility: HOSPITAL | Age: 57
End: 2017-08-31
Payer: MEDICARE

## 2017-08-31 ENCOUNTER — HOSPITAL ENCOUNTER (OUTPATIENT)
Facility: HOSPITAL | Age: 57
Discharge: HOME | End: 2017-08-31
Attending: INTERNAL MEDICINE | Admitting: INTERNAL MEDICINE
Payer: MEDICARE

## 2017-08-31 ENCOUNTER — PATIENT MESSAGE (OUTPATIENT)
Dept: OTOLARYNGOLOGY | Facility: CLINIC | Age: 57
End: 2017-08-31

## 2017-08-31 ENCOUNTER — ANESTHESIA (OUTPATIENT)
Dept: ENDOSCOPY | Facility: HOSPITAL | Age: 57
End: 2017-08-31
Payer: MEDICARE

## 2017-08-31 VITALS
DIASTOLIC BLOOD PRESSURE: 71 MMHG | SYSTOLIC BLOOD PRESSURE: 123 MMHG | RESPIRATION RATE: 20 BRPM | HEART RATE: 60 BPM | BODY MASS INDEX: 25.9 KG/M2 | HEIGHT: 67 IN | TEMPERATURE: 97 F | OXYGEN SATURATION: 100 % | WEIGHT: 165 LBS | RESPIRATION RATE: 33 BRPM

## 2017-08-31 DIAGNOSIS — Z12.11 COLON CANCER SCREENING: ICD-10-CM

## 2017-08-31 PROCEDURE — 45385 COLONOSCOPY W/LESION REMOVAL: CPT | Mod: PO | Performed by: INTERNAL MEDICINE

## 2017-08-31 PROCEDURE — 27201089 HC SNARE, DISP (ANY): Mod: PO | Performed by: INTERNAL MEDICINE

## 2017-08-31 PROCEDURE — 37000008 HC ANESTHESIA 1ST 15 MINUTES: Mod: PO | Performed by: INTERNAL MEDICINE

## 2017-08-31 PROCEDURE — D9220A PRA ANESTHESIA: Mod: PT,CRNA,, | Performed by: NURSE ANESTHETIST, CERTIFIED REGISTERED

## 2017-08-31 PROCEDURE — 88305 TISSUE EXAM BY PATHOLOGIST: CPT | Performed by: PATHOLOGY

## 2017-08-31 PROCEDURE — 25000003 PHARM REV CODE 250: Mod: PO | Performed by: INTERNAL MEDICINE

## 2017-08-31 PROCEDURE — 45385 COLONOSCOPY W/LESION REMOVAL: CPT | Mod: PT,,, | Performed by: INTERNAL MEDICINE

## 2017-08-31 PROCEDURE — 37000009 HC ANESTHESIA EA ADD 15 MINS: Mod: PO | Performed by: INTERNAL MEDICINE

## 2017-08-31 PROCEDURE — 88305 TISSUE EXAM BY PATHOLOGIST: CPT | Mod: 26,,, | Performed by: PATHOLOGY

## 2017-08-31 PROCEDURE — 63600175 PHARM REV CODE 636 W HCPCS: Mod: PO | Performed by: NURSE ANESTHETIST, CERTIFIED REGISTERED

## 2017-08-31 PROCEDURE — D9220A PRA ANESTHESIA: Mod: PT,ANES,, | Performed by: ANESTHESIOLOGY

## 2017-08-31 RX ORDER — POLYETHYLENE GLYCOL 3350 17 G/17G
17 POWDER, FOR SOLUTION ORAL DAILY
Qty: 527 G | Refills: 11 | Status: SHIPPED | OUTPATIENT
Start: 2017-08-31 | End: 2018-06-21

## 2017-08-31 RX ORDER — PROPOFOL 10 MG/ML
VIAL (ML) INTRAVENOUS
Status: DISCONTINUED | OUTPATIENT
Start: 2017-08-31 | End: 2017-08-31

## 2017-08-31 RX ORDER — SODIUM CHLORIDE, SODIUM LACTATE, POTASSIUM CHLORIDE, CALCIUM CHLORIDE 600; 310; 30; 20 MG/100ML; MG/100ML; MG/100ML; MG/100ML
INJECTION, SOLUTION INTRAVENOUS CONTINUOUS
Status: DISCONTINUED | OUTPATIENT
Start: 2017-08-31 | End: 2017-08-31 | Stop reason: HOSPADM

## 2017-08-31 RX ORDER — LIDOCAINE HCL/PF 100 MG/5ML
SYRINGE (ML) INTRAVENOUS
Status: DISCONTINUED | OUTPATIENT
Start: 2017-08-31 | End: 2017-08-31

## 2017-08-31 RX ORDER — LIDOCAINE HYDROCHLORIDE 10 MG/ML
1 INJECTION, SOLUTION EPIDURAL; INFILTRATION; INTRACAUDAL; PERINEURAL ONCE
Status: COMPLETED | OUTPATIENT
Start: 2017-08-31 | End: 2017-08-31

## 2017-08-31 RX ADMIN — PROPOFOL 100 MG: 10 INJECTION, EMULSION INTRAVENOUS at 08:08

## 2017-08-31 RX ADMIN — PROPOFOL 20 MG: 10 INJECTION, EMULSION INTRAVENOUS at 08:08

## 2017-08-31 RX ADMIN — PROPOFOL 50 MG: 10 INJECTION, EMULSION INTRAVENOUS at 08:08

## 2017-08-31 RX ADMIN — LIDOCAINE HYDROCHLORIDE 1 MG: 10 INJECTION, SOLUTION EPIDURAL; INFILTRATION; INTRACAUDAL; PERINEURAL at 08:08

## 2017-08-31 RX ADMIN — SODIUM CHLORIDE, SODIUM LACTATE, POTASSIUM CHLORIDE, AND CALCIUM CHLORIDE: .6; .31; .03; .02 INJECTION, SOLUTION INTRAVENOUS at 08:08

## 2017-08-31 RX ADMIN — LIDOCAINE HYDROCHLORIDE 100 MG: 20 INJECTION, SOLUTION INTRAVENOUS at 08:08

## 2017-08-31 RX ADMIN — PROPOFOL 20 MG: 10 INJECTION, EMULSION INTRAVENOUS at 09:08

## 2017-08-31 NOTE — H&P
History & Physical - Short Stay  Gastroenterology      SUBJECTIVE:     Procedure: Colonoscopy    Chief Complaint/Indication for Procedure: Screening    History of Present Illness:  Office Visit     2/8/2017  [facility]      Cleopatra Monahan MD   Family Medicine   Screen for colon cancer +2 more   Dx   Medication Refill   Reason for Visit    Progress Notes        Subjective:       Patient ID: Nena Craig is a 56 y.o. female.     Chief Complaint: Medication Refill     HPI   Ms. rCaig is a 55 yo female w/ a PMH of ADHD, hypothyroidism, anxiety, and trigeminal neuralgia who presented this AM for a routine f/u.  Pt c/o worsening of her GERD symptoms.  She requested to be placed back on prescription-strength pantoprazole, since she has been out of it for over a month now.  Additionally, she stated she is having occasional, severe HA associated w/ her trigeminal neuralgia. She is being followed by neurology.  Needs cheaper alternative to adderall xr while the patient assistance paperwork is processed for vyvanse.     Pt is otherwise well, overdue health maintenance was discussed and pt expressed understanding.     Screen for colon cancer  -     Case request GI: COLONOSCOPY     Gastroesophageal reflux disease without esophagitis  -     pantoprazole (PROTONIX) 20 MG tablet; Take 1 tablet (20 mg total) by mouth once daily.  Dispense: 90 tablet; Refill: 1  Trial at 20mg dose, if not sufficiently effective, can increase to 40mg daily.  Attention deficit hyperactivity disorder (ADHD), unspecified ADHD type  -     dextroamphetamine-amphetamine (ADDERALL) 15 mg tablet; Take 1 tablet (15 mg total) by mouth 2 (two) times daily.  Dispense: 60 tablet; Refill: 0  Side effects and precautions of medication use reviewed with patient, expressed understanding. No questions or concerns.      Risks, benefits, and limitations of all medications and interventions were discussed, pt expressed understanding.            Reports a history of diverticulitis in 2005 which led to a bowel perforation requiring colon resection and a colectomy. No recent sx. She uses vegetable laxatives daily for BM..        PTA Medications   Medication Sig    clonazePAM (KLONOPIN) 0.5 MG tablet Take 0.5 mg by mouth 3 (three) times daily.    [START ON 10/7/2017] dextroamphetamine-amphetamine (ADDERALL XR) 30 MG 24 hr capsule Take 1 capsule (30 mg total) by mouth every morning.    gabapentin (NEURONTIN) 600 MG tablet Take 1 tablet (600 mg total) by mouth 3 (three) times daily.    Lactobacillus rhamnosus GG (CULTURELLE) 10 billion cell capsule Take 1 capsule by mouth once daily.    levothyroxine (SYNTHROID) 88 MCG tablet TAKE ONE TABLET BY MOUTH ONCE DAILY    pantoprazole (PROTONIX) 20 MG tablet Take 1 tablet (20 mg total) by mouth once daily.    triamcinolone acetonide 0.1% (KENALOG) 0.1 % cream Apply topically 2 (two) times daily.    azelastine (ASTELIN) 137 mcg (0.1 %) nasal spray 2 sprays (274 mcg total) by Nasal route once daily.    COCONUT OIL ORAL Take by mouth.    estradiol (ESTRACE) 0.5 MG tablet Take 1 tablet (0.5 mg total) by mouth once daily.    loratadine (CLARITIN) 10 mg tablet Take 1 tablet (10 mg total) by mouth once daily.    metronidazole 1% (METROGEL) 1 % Gel Apply topically once daily.       Review of patient's allergies indicates:  No Known Allergies     Past Medical History:   Diagnosis Date    ADHD (attention deficit hyperactivity disorder)     Allergic rhinitis due to pollen 8/6/2014    Anxiety     Diverticulitis     Hypothyroidism     Perforated diverticulum of duodenum     Trigeminal neuralgia of left side of face      Past Surgical History:   Procedure Laterality Date    CHOLECYSTECTOMY  November 1985    COLONOSCOPY  12/01/2006    ANGELITO.   Rose-colonic anastomosis in the sigmoid colon.  Otherwise normal.    COLONOSCOPY  01/19/2004    MARLYS.   Diverticulosis.    Colostomy repair  09/20/2005    ANGELITO.    "Takedown of colostomy.    FRACTURE SURGERY  August 2009 & November 2009    right elbow    HERNIA REPAIR  2006 & 2007    HYSTERECTOMY  February 2009    PANCHO-BSO, for benign causes by reported hx    [place] EXPLORATORY OF ABDOMEN  08/06/2005    BETI.   Diverticular rupture.  Colostomy.    SINUS SURGERY  April 1998    THYROID SURGERY  1996 & 2007    2 surgeries for nodules     Family History   Problem Relation Age of Onset    Alcohol abuse Mother     Heart disease Mother 73     fatal MI    Neuropathy Mother     Allergic rhinitis Mother     Cancer Father      prostate ca to bone    Diabetes Father     Hypertension Father     ADD / ADHD Father     ADD / ADHD Brother     Alcohol abuse Brother     Allergic rhinitis Brother     ADD / ADHD Other     Angioedema Neg Hx     Asthma Neg Hx     Eczema Neg Hx     Immunodeficiency Neg Hx     Urticaria Neg Hx      Social History   Substance Use Topics    Smoking status: Former Smoker     Quit date: 8/31/1996    Smokeless tobacco: Never Used    Alcohol use 0.0 oz/week      Comment: occ         OBJECTIVE:     Vital Signs (Most Recent)  Temp: 97.5 °F (36.4 °C) (08/31/17 0804)  Pulse: 68 (08/31/17 0804)  Resp: 18 (08/31/17 0804)  BP: 123/69 (08/31/17 0804)  SpO2: 99 % (08/31/17 0804)    Physical Exam:           :    Ht 5' 7" (1.702 m)    Wt 76.1 kg (167 lb 14.1 oz)    BMI 26.29 kg/m²                     GENERAL:  Comfortable, in no acute distress.                                 HEENT EXAM:  Nonicteric.  No adenopathy.  Oropharynx is clear.               NECK:  Supple.                                                               LUNGS:  Clear.                                                               CARDIAC:  Regular rate and rhythm.  S1, S2.  No murmur.                      ABDOMEN:  Soft, positive bowel sounds, nontender.  No hepatosplenomegaly or masses.  No rebound or guarding.                                             EXTREMITIES:  No edema.   "   MENTAL STATUS:  Alert and oriented.    ASSESSMENT/PLAN:     Assessment: Colorectal cancer screening    Plan: Colonoscopy    Anesthesia Plan:   MAC / General Anaesthesia    ASA Grade: ASA 2 - Patient with mild systemic disease with no functional limitations    MALLAMPATI SCORE: I (soft palate, uvula, fauces, and tonsillar pillars visible)

## 2017-08-31 NOTE — TRANSFER OF CARE
"Anesthesia Transfer of Care Note    Patient: Nena Craig    Procedure(s) Performed: Procedure(s) (LRB):  COLONOSCOPY (N/A)    Patient location: PACU    Anesthesia Type: general    Transport from OR: Transported from OR on room air with adequate spontaneous ventilation    Post pain: adequate analgesia    Post assessment: no apparent anesthetic complications and tolerated procedure well    Post vital signs: stable    Level of consciousness: lethargic and responds to stimulation    Nausea/Vomiting: no nausea/vomiting    Complications: none    Transfer of care protocol was followed      Last vitals:   Visit Vitals  /69 (BP Location: Right arm, Patient Position: Lying)   Pulse 68   Temp 36.4 °C (97.5 °F) (Skin)   Resp 18   Ht 5' 7" (1.702 m)   Wt 74.8 kg (165 lb)   SpO2 99%   Breastfeeding? No   BMI 25.84 kg/m²     "

## 2017-08-31 NOTE — ANESTHESIA PREPROCEDURE EVALUATION
08/31/2017  Nena Craig is a 57 y.o., female.    Anesthesia Evaluation      I have reviewed the Medications.     Review of Systems  Anesthesia Hx:  No problems with previous Anesthesia   Social:  Former Smoker    Cardiovascular:  Cardiovascular Normal     Pulmonary:  Pulmonary Normal    Renal/:  Renal/ Normal     Hepatic/GI:  Hepatic/GI Normal    Neurological:   Peripheral Neuropathy (trigeminal neuralgia left)    Endocrine:   Hypothyroidism    Psych:   anxiety          Physical Exam  General:  Well nourished    Airway/Jaw/Neck:  Airway Findings: Mouth Opening: Normal General Airway Assessment: Adult  Mallampati: II  Jaw/Neck Findings:  Neck ROM: Extension Decreased, Mild       Chest/Lungs:  Chest/Lungs Findings: Clear to auscultation, Normal Respiratory Rate     Heart/Vascular:  Heart Findings: Rate: Normal  Rhythm: Regular Rhythm  Sounds: Normal  Heart murmur: negative Vascular Findings: Normal (No carotid bruits.)       Mental Status:  Mental Status Findings:  Cooperative, Alert and Oriented         Anesthesia Plan  Type of Anesthesia, risks & benefits discussed:  Anesthesia Type:  general  Patient's Preference:   Intra-op Monitoring Plan:   Intra-op Monitoring Plan Comments:   Post Op Pain Control Plan:   Post Op Pain Control Plan Comments:   Induction:   IV  Beta Blocker:  Patient is not currently on a Beta-Blocker (No further documentation required).       Informed Consent: Patient understands risks and agrees with Anesthesia plan.  Questions answered. Anesthesia consent signed with patient.  ASA Score: 2     Day of Surgery Review of History & Physical:        Anesthesia Plan Notes: Propofol general.        Ready For Surgery From Anesthesia Perspective.

## 2017-08-31 NOTE — DISCHARGE INSTRUCTIONS
Recovery After Procedural Sedation (Adult)  You have been given medicine by vein to make you sleep during your surgery. This may have included both a pain medicine and sleeping medicine. Most of the effects have worn off. But you may still have some drowsiness for the next 6 to 8 hours.  Home care  Follow these guidelines when you get home:  · For the next 8 hours, you should be watched by a responsible adult. This person should make sure your condition is not getting worse.  · Don't drink any alcohol for the next 24 hours.  · Don't drive, operate dangerous machinery, or make important business or personal decisions during the next 24 hours.  Note: Your healthcare provider may tell you not to take any medicine by mouth for pain or sleep in the next 4 hours. These medicines may react with the medicines you were given in the hospital. This could cause a much stronger response than usual.  Follow-up care  Follow up with your healthcare provider if you are not alert and back to your usual level of activity within 12 hours.  When to seek medical advice  Call your healthcare provider right away if any of these occur:  · Drowsiness gets worse  · Weakness or dizziness gets worse  · Repeated vomiting  · You can't be awakened   Date Last Reviewed: 10/18/2016  © 2914-6606 [employer]. [address] [place] [address], [place] [address]. All rights reserved. This information is not intended as a substitute for professional medical care. Always follow your healthcare professional's instructions.      PROBIOTICS:  Now that your colon is so cleaned out, now is a good time for a round of PROBIOTICS.  Take a Probiotic product such as Align or Culturelle or Debby-Q, every day for a month.                  (The products listed are non-prescription, but you may need to ask the pharmacist for their location.)  Repeat this at least times a year.        High-Fiber Diet  Fiber is in fruits, vegetables, cereals, and grains. Fiber  passes through your body undigested. A high-fiber diet helps food move through your intestinal tract. The added bulk is helpful in preventing constipation. In people with diverticulosis, fiber helps clean out the pouches along the colon wall. It also prevents new pouches from forming. A high-fiber diet reduces the risk of colon cancer. It also lowers blood cholesterol and prevents high blood sugar in people with diabetes.    The fiber-rich foods listed below should be part of your diet. If you are not used to high-fiber foods, start with 1 or 2 foods from this list. Every 3 to 4 days add a new one to your diet. Do this until you are eating 4 high-fiber foods per day. This should give you 20 to 35 grams of fiber a day. It is also important to drink a lot of water when you are on this diet. You should have 6 to 8 glasses of water a day. Water makes the fiber swell and increases the benefit.  Foods high in dietary fiber  The following foods are high in dietary fiber:  · Breads. Breads made with 100% whole-wheat flour; christy, wheat, or rye crackers; whole-grain tortillas, bran muffins.  · Cereals. Whole-grain and bran cereals with bran (shredded wheat, wheat flakes, raisin bran, corn bran); oatmeal, rolled oats, granola, and brown rice.  · Fruits. Fresh fruits and their edible skins (pears, prunes, raisins, berries, apples, and apricots); bananas, citrus fruit, mangoes, pineapple; and prune juice.  · Nuts. Any nuts and seeds.  · Vegetables. Best served raw or lightly cooked. All types, especially: green peas, celery, eggplant, potatoes, spinach, broccoli, [place] sprouts, winter squash, carrots, cauliflower, soybeans, lentils, and fresh and dried beans of all kinds.  · Other. Popcorn, any spices.  Date Last Reviewed: 8/1/2016  © 2729-5190 [employer]. [address] [place] [address], [place] [address]. All rights reserved. This information is not intended as a substitute for professional medical care. Always  follow your healthcare professional's instructions.

## 2017-08-31 NOTE — ANESTHESIA POSTPROCEDURE EVALUATION
"Anesthesia Post Evaluation    Patient: Nena Craig    Procedure(s) Performed: Procedure(s) (LRB):  COLONOSCOPY (N/A)    Final Anesthesia Type: general  Patient location during evaluation: PACU  Patient participation: Yes- Able to Participate  Level of consciousness: awake and alert  Post-procedure vital signs: reviewed and stable  Pain management: adequate  Airway patency: patent  PONV status at discharge: No PONV  Anesthetic complications: no      Cardiovascular status: hemodynamically stable and blood pressure returned to baseline  Respiratory status: unassisted, spontaneous ventilation and room air  Hydration status: euvolemic  Follow-up not needed.        Visit Vitals  /71   Pulse 60   Temp 36.1 °C (97 °F) (Temporal)   Resp 20   Ht 5' 7" (1.702 m)   Wt 74.8 kg (165 lb)   SpO2 100%   Breastfeeding? No   BMI 25.84 kg/m²       Pain/Vandana Score: Pain Assessment Performed: Yes (8/31/2017  9:31 AM)  Presence of Pain: denies (8/31/2017  9:31 AM)  Vandana Score: 10 (8/31/2017  9:20 AM)      "

## 2017-08-31 NOTE — BRIEF OP NOTE
Discharge Note  Short Stay      SUMMARY     Admit Date: 8/31/2017    Attending Physician: Alan Tijerina Jr., MD     Discharge Physician: Alan Tijerina Jr., MD    Discharge Date: 8/31/2017 9:24 AM    Final Diagnosis: Screen for colon cancer [Z12.11]    Anastomosis at 10-12 cm.  One 2-3 mm polyp in the transverse colon.  Single diverticulum in the descending colon.  Otherwise normal colon and TI.    Disposition: HOME OR SELF CARE    Patient Instructions:   Current Discharge Medication List      START taking these medications    Details   polyethylene glycol (GLYCOLAX) 17 gram/dose powder Take 17 g by mouth once daily. Take 1 capful, mixed in water or juice, every day (or up to 4 times a day if needed) to prevent / relieve constipation.  Qty: 527 g, Refills: 11         CONTINUE these medications which have NOT CHANGED    Details   clonazePAM (KLONOPIN) 0.5 MG tablet Take 0.5 mg by mouth 3 (three) times daily.      dextroamphetamine-amphetamine (ADDERALL XR) 30 MG 24 hr capsule Take 1 capsule (30 mg total) by mouth every morning.  Qty: 30 capsule, Refills: 0    Associated Diagnoses: Attention deficit hyperactivity disorder (ADHD), combined type      gabapentin (NEURONTIN) 600 MG tablet Take 1 tablet (600 mg total) by mouth 3 (three) times daily.  Qty: 90 tablet, Refills: 1    Associated Diagnoses: Idiopathic trigeminal neuralgia      Lactobacillus rhamnosus GG (CULTURELLE) 10 billion cell capsule Take 1 capsule by mouth once daily.      levothyroxine (SYNTHROID) 88 MCG tablet TAKE ONE TABLET BY MOUTH ONCE DAILY  Qty: 30 tablet, Refills: 3    Associated Diagnoses: Hypothyroidism due to acquired atrophy of thyroid      pantoprazole (PROTONIX) 20 MG tablet Take 1 tablet (20 mg total) by mouth once daily.  Qty: 90 tablet, Refills: 1    Associated Diagnoses: Gastroesophageal reflux disease without esophagitis      triamcinolone acetonide 0.1% (KENALOG) 0.1 % cream Apply topically 2 (two) times daily.  Qty: 1 Tube,  Refills: 0    Associated Diagnoses: Allergic contact dermatitis, unspecified trigger      azelastine (ASTELIN) 137 mcg (0.1 %) nasal spray 2 sprays (274 mcg total) by Nasal route once daily.  Qty: 30 mL, Refills: 12    Associated Diagnoses: Allergic rhinitis, unspecified allergic rhinitis type      COCONUT OIL ORAL Take by mouth.      estradiol (ESTRACE) 0.5 MG tablet Take 1 tablet (0.5 mg total) by mouth once daily.  Qty: 90 tablet, Refills: 3    Associated Diagnoses: Menopause syndrome      loratadine (CLARITIN) 10 mg tablet Take 1 tablet (10 mg total) by mouth once daily.  Qty: 90 tablet, Refills: 1    Associated Diagnoses: Allergic rhinitis due to pollen      metronidazole 1% (METROGEL) 1 % Gel Apply topically once daily.  Qty: 60 g, Refills: 0    Associated Diagnoses: Rosacea             Discharge Procedure Orders (must include Diet, Follow-up, Activity)    Follow Up:  Follow up with PCP as per your routine.  Please follow a high fiber diet.  Activity as tolerated.    No driving day of procedure.    PROBIOTICS:  Now that your colon is so cleaned out, now is a good time for a round of PROBIOTICS.  Take a Probiotic product such as Align or Culturelle or Debby-Q, every day for a month.                  (The products listed are non-prescription, but you may need to ask the pharmacist for their location.)  Repeat this at least times a year.

## 2017-09-11 ENCOUNTER — PATIENT MESSAGE (OUTPATIENT)
Dept: OTOLARYNGOLOGY | Facility: CLINIC | Age: 57
End: 2017-09-11

## 2017-09-12 ENCOUNTER — TELEPHONE (OUTPATIENT)
Dept: OTOLARYNGOLOGY | Facility: CLINIC | Age: 57
End: 2017-09-12

## 2017-09-12 NOTE — TELEPHONE ENCOUNTER
----- Message from Gino Farmer sent at 9/12/2017 10:38 AM CDT -----  Contact: [phone]  Pt returning call regarding cancelled appt, would like to reschedule to sooner appt, please follow up at [phone]

## 2017-09-19 ENCOUNTER — TELEPHONE (OUTPATIENT)
Dept: OTOLARYNGOLOGY | Facility: CLINIC | Age: 57
End: 2017-09-19

## 2017-09-19 NOTE — TELEPHONE ENCOUNTER
----- Message from Lolly Menchaca sent at 9/19/2017 11:12 AM CDT -----  Call patient's  about getting her sooner appt than 10/12. Says she is suffering. Most urgent. Someone was to call them back. Call him at [phone].

## 2017-10-12 ENCOUNTER — OFFICE VISIT (OUTPATIENT)
Dept: OTOLARYNGOLOGY | Facility: CLINIC | Age: 57
End: 2017-10-12
Payer: MEDICARE

## 2017-10-12 VITALS
SYSTOLIC BLOOD PRESSURE: 132 MMHG | WEIGHT: 165.13 LBS | DIASTOLIC BLOOD PRESSURE: 90 MMHG | HEART RATE: 85 BPM | BODY MASS INDEX: 25.86 KG/M2

## 2017-10-12 DIAGNOSIS — J30.89 PERENNIAL ALLERGIC RHINITIS WITH SEASONAL VARIATION: ICD-10-CM

## 2017-10-12 DIAGNOSIS — G50.0 TRIGEMINAL NEURALGIA OF LEFT SIDE OF FACE: Primary | ICD-10-CM

## 2017-10-12 DIAGNOSIS — J30.2 PERENNIAL ALLERGIC RHINITIS WITH SEASONAL VARIATION: ICD-10-CM

## 2017-10-12 PROCEDURE — 99214 OFFICE O/P EST MOD 30 MIN: CPT | Mod: 25,S$GLB,, | Performed by: OTOLARYNGOLOGY

## 2017-10-12 PROCEDURE — 99999 PR PBB SHADOW E&M-EST. PATIENT-LVL III: CPT | Mod: PBBFAC,,, | Performed by: OTOLARYNGOLOGY

## 2017-10-12 PROCEDURE — 31231 NASAL ENDOSCOPY DX: CPT | Mod: S$GLB,,, | Performed by: OTOLARYNGOLOGY

## 2017-10-12 PROCEDURE — 99499 UNLISTED E&M SERVICE: CPT | Mod: S$GLB,,, | Performed by: OTOLARYNGOLOGY

## 2017-10-12 NOTE — PROGRESS NOTES
Subjective:      Nena Craig is a 57 y.o. female who was self-referred for infraorbital pain.    She relates a long history for many years of left-sided burning and tingling pain in her midface which radiates to her ear.  She describes this as intermittent, and exacerbated by rainy weather, humidity and allergies.  She recalls positive allergy test for pollen and dust mutes, but has not had immunotherapy.  She takes claritin occasionally, and previously used flonase and nasal saline.  She has been diagnosed with trigeminal neuralgia which is somewhat improved with gabapentin, though incompletely.  She describes perennial itching, sneezing, nasal blockage, mouth breathing, and occasional yellow nasal discharge and postnasal drip.  She denies hyposmia or aural fullness.  She was recently told that there was a polyp in her nose.  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  She relates a history of two prior sinus surgeries, but no more recent sinonasal trauma.    SNOT-22 score = 89, NOSE score = 80%    Global QOL = 35%    Days missed = Less than 5    PTC = deferred      Past Medical History  She has a past medical history of ADHD (attention deficit hyperactivity disorder); Allergic rhinitis due to pollen; Anxiety; Diverticulitis; Hypothyroidism; Perforated diverticulum of duodenum; and Trigeminal neuralgia of left side of face.    Past Surgical History  She has a past surgical history that includes Cholecystectomy (November 1985); Thyroid surgery (1996 & 2007); exploratory of abdomen (08/06/2005); Hernia repair (2006 & 2007); Sinus surgery (April 1998); Colostomy repair (09/20/2005); Hysterectomy (February 2009); Fracture surgery (August 2009 & November 2009); Colonoscopy (12/01/2006); Colonoscopy (01/19/2004); and Colonoscopy (N/A, 8/31/2017).    Family History  Her family history includes ADD / ADHD in her brother, father, and other; Alcohol abuse in her brother and  mother; Allergic rhinitis in her brother and mother; Cancer in her father; Diabetes in her father; Heart disease (age of onset: 73) in her mother; Hypertension in her father; Neuropathy in her mother.    Social History  She reports that she quit smoking about 21 years ago. She has never used smokeless tobacco. She reports that she drinks alcohol. She reports that she does not use drugs.    Allergies  She has No Known Allergies.    Medications   She has a current medication list which includes the following prescription(s): azelastine, clonazepam, coconut oil, dextroamphetamine-amphetamine, estradiol, gabapentin, lactobacillus rhamnosus gg, levothyroxine, loratadine, metronidazole 1%, pantoprazole, polyethylene glycol, and triamcinolone acetonide 0.1%.    Review of Systems  Review of Systems   Constitutional: Positive for fatigue. Negative for fever and unexpected weight change.   HENT: Positive for congestion, dental problem, ear pain, facial swelling, postnasal drip, sinus pressure, sore throat, tinnitus and voice change. Negative for ear discharge, hearing loss, hoarse voice, nosebleeds, rhinorrhea and trouble swallowing.    Eyes: Negative for photophobia, discharge, itching and visual disturbance.   Respiratory: Positive for apnea. Negative for cough, shortness of breath and wheezing.    Cardiovascular: Negative for chest pain and palpitations.   Gastrointestinal: Negative for abdominal pain, nausea and vomiting.   Endocrine: Negative for cold intolerance and heat intolerance.   Genitourinary: Negative for difficulty urinating.   Musculoskeletal: Negative for arthralgias, back pain, myalgias and neck pain.   Skin: Negative for rash.   Allergic/Immunologic: Positive for environmental allergies. Negative for food allergies.   Neurological: Positive for headaches. Negative for dizziness, seizures, syncope and weakness.   Hematological: Negative for adenopathy. Does not bruise/bleed easily.   Psychiatric/Behavioral:  Positive for decreased concentration, dysphoric mood and sleep disturbance. The patient is nervous/anxious.           Objective:     BP (!) 132/90   Pulse 85   Wt 74.9 kg (165 lb 2 oz)   BMI 25.86 kg/m²        Constitutional:   She appears well-developed. She is cooperative. Normal speech.  No hoarse voice.      Head:  Normocephalic. Salivary glands normal.  Facial strength is normal.      Ears:    Right Ear: No drainage or tenderness. Tympanic membrane is not perforated. Tympanic membrane mobility is normal. No middle ear effusion. No decreased hearing is noted.   Left Ear: No drainage or tenderness. Tympanic membrane is not perforated. Tympanic membrane mobility is normal.  No middle ear effusion. No decreased hearing is noted.     Nose:  No mucosal edema, rhinorrhea, septal deviation or polyps. No epistaxis. Turbinates normal, no turbinate masses and no turbinate hypertrophy.  Right sinus exhibits no maxillary sinus tenderness and no frontal sinus tenderness. Left sinus exhibits no maxillary sinus tenderness and no frontal sinus tenderness.     Mouth/Throat  Oropharynx clear and moist without lesions or asymmetry and normal uvula midline. She does not have dentures. Normal dentition. No oral lesions or mucous membrane lesions. No oropharyngeal exudate or posterior oropharyngeal erythema. Mirror exam not performed due to patient tolerance.  Mirror exam not performed due to patient tolerance.      Neck:  Neck normal without thyromegaly masses, asymmetry, normal tracheal structure, crepitus, and tenderness, thyroid normal, trachea normal and no adenopathy. Normal range of motion present.     She has no cervical adenopathy.     Cardiovascular:   Regular rhythm.      Pulmonary/Chest:   Effort normal.     Psychiatric:   She has a normal mood and affect. Her speech is normal and behavior is normal.     Neurological:   No cranial nerve deficit.     Skin:   No rash noted.       Procedure    Nasal endoscopy performed.   See procedure note.     Left nasal valve     Left MT     Left maxillary antrostomy     Nasopharynx     Right nasal valve     Right MT     Right maxillary antrostomy     Right ET        Data Reviewed    WBC (K/uL)   Date Value   08/24/2017 4.71     Eosinophil% (%)   Date Value   08/24/2017 4.2     Eos # (K/uL)   Date Value   08/24/2017 0.2     Platelets (K/uL)   Date Value   08/24/2017 203     Glucose (mg/dL)   Date Value   08/24/2017 98     IgE (IU/mL)   Date Value   11/09/2015 <35       I independently reviewed the images of the CT sinuses dated 8/24/17. Pertinent findings include clear sinuses including widely patent maxillary antrostomies bilaterally.       Assessment:     1. Trigeminal neuralgia of left side of face    2. Perennial allergic rhinitis with seasonal variation         Plan:     I had a long discussion with the patient regarding her condition and the further workup and management options.  I reassured her as to the benign nature of today's findings, including the absence of polyps or infection.  Her presentation is consistent with a neurological condition, which is modified by chronic rhinitis.  Although she inquires about surgical options, I do not recommend radical surgical resection of the trigeminal nerve unless all medical options have been exhausted.  I recommended generic loratadine for control of allergies, and am referring him back to Dr. Shine for further management of the neuralgia.    Return if symptoms worsen or fail to improve.

## 2017-10-12 NOTE — PROCEDURES
Nasal/sinus endoscopy  Date/Time: 10/12/2017 10:04 AM  Performed by: MICAELA LIRIANO  Authorized by: MICAELA LIRIANO     Consent Done?:  Yes (Verbal)  Anesthesia:     Local anesthetic:  Lidocaine 4% and Jeff-Synephrine 1/2%    Patient tolerance:  Patient tolerated the procedure well with no immediate complications  Nose:     Procedure Performed:  Nasal Endoscopy  External:      No external nasal deformity  Intranasal:      Mucosa no polyps     Mucosa ulcers not present     No mucosa lesions present     Turbinates not enlarged     No septum gross deformity  Nasopharynx:      No mucosa lesions     Adenoids not present     Posterior choanae patent     Eustachian tube patent     Patent maxillary antrostomies.  No exudate or polyps.  Scant clear posterior mucus.

## 2017-11-14 ENCOUNTER — OFFICE VISIT (OUTPATIENT)
Dept: FAMILY MEDICINE | Facility: CLINIC | Age: 57
End: 2017-11-14
Payer: MEDICARE

## 2017-11-14 VITALS
HEART RATE: 88 BPM | SYSTOLIC BLOOD PRESSURE: 120 MMHG | HEIGHT: 67 IN | WEIGHT: 166.69 LBS | BODY MASS INDEX: 26.16 KG/M2 | TEMPERATURE: 98 F | DIASTOLIC BLOOD PRESSURE: 80 MMHG

## 2017-11-14 DIAGNOSIS — F90.2 ATTENTION DEFICIT HYPERACTIVITY DISORDER (ADHD), COMBINED TYPE: Primary | ICD-10-CM

## 2017-11-14 DIAGNOSIS — I70.0 AORTIC ATHEROSCLEROSIS: ICD-10-CM

## 2017-11-14 DIAGNOSIS — E03.4 HYPOTHYROIDISM DUE TO ACQUIRED ATROPHY OF THYROID: ICD-10-CM

## 2017-11-14 PROCEDURE — 90686 IIV4 VACC NO PRSV 0.5 ML IM: CPT | Mod: S$GLB,,, | Performed by: INTERNAL MEDICINE

## 2017-11-14 PROCEDURE — 99213 OFFICE O/P EST LOW 20 MIN: CPT | Mod: 25,S$GLB,, | Performed by: INTERNAL MEDICINE

## 2017-11-14 PROCEDURE — 99499 UNLISTED E&M SERVICE: CPT | Mod: S$GLB,,, | Performed by: INTERNAL MEDICINE

## 2017-11-14 PROCEDURE — G0008 ADMIN INFLUENZA VIRUS VAC: HCPCS | Mod: S$GLB,,, | Performed by: INTERNAL MEDICINE

## 2017-11-14 PROCEDURE — 99999 PR PBB SHADOW E&M-EST. PATIENT-LVL III: CPT | Mod: PBBFAC,,, | Performed by: INTERNAL MEDICINE

## 2017-11-14 RX ORDER — DEXTROAMPHETAMINE SACCHARATE, AMPHETAMINE ASPARTATE MONOHYDRATE, DEXTROAMPHETAMINE SULFATE AND AMPHETAMINE SULFATE 7.5; 7.5; 7.5; 7.5 MG/1; MG/1; MG/1; MG/1
30 CAPSULE, EXTENDED RELEASE ORAL EVERY MORNING
Qty: 30 CAPSULE | Refills: 0 | Status: SHIPPED | OUTPATIENT
Start: 2017-11-14 | End: 2018-03-15 | Stop reason: SDUPTHER

## 2017-11-14 RX ORDER — DEXTROAMPHETAMINE SACCHARATE, AMPHETAMINE ASPARTATE MONOHYDRATE, DEXTROAMPHETAMINE SULFATE AND AMPHETAMINE SULFATE 7.5; 7.5; 7.5; 7.5 MG/1; MG/1; MG/1; MG/1
30 CAPSULE, EXTENDED RELEASE ORAL EVERY MORNING
Qty: 30 CAPSULE | Refills: 0 | Status: SHIPPED | OUTPATIENT
Start: 2018-01-13 | End: 2018-03-15 | Stop reason: SDUPTHER

## 2017-11-14 RX ORDER — LEVOTHYROXINE SODIUM 88 UG/1
88 TABLET ORAL DAILY
Qty: 30 TABLET | Refills: 5 | Status: SHIPPED | OUTPATIENT
Start: 2017-11-14 | End: 2017-12-06 | Stop reason: SDUPTHER

## 2017-11-14 RX ORDER — DEXTROAMPHETAMINE SACCHARATE, AMPHETAMINE ASPARTATE MONOHYDRATE, DEXTROAMPHETAMINE SULFATE AND AMPHETAMINE SULFATE 7.5; 7.5; 7.5; 7.5 MG/1; MG/1; MG/1; MG/1
30 CAPSULE, EXTENDED RELEASE ORAL EVERY MORNING
Qty: 30 CAPSULE | Refills: 0 | Status: SHIPPED | OUTPATIENT
Start: 2017-12-14 | End: 2018-03-15 | Stop reason: SDUPTHER

## 2017-11-14 NOTE — PROGRESS NOTES
Assessment and Plan:    1. Attention deficit hyperactivity disorder (ADHD), combined type  Stable on current medications. Will sign controlled substances agreement today.   - dextroamphetamine-amphetamine (ADDERALL XR) 30 MG 24 hr capsule; Take 1 capsule (30 mg total) by mouth every morning.  Dispense: 30 capsule; Refill: 0  - dextroamphetamine-amphetamine (ADDERALL XR) 30 MG 24 hr capsule; Take 1 capsule (30 mg total) by mouth every morning.  Dispense: 30 capsule; Refill: 0  - dextroamphetamine-amphetamine (ADDERALL XR) 30 MG 24 hr capsule; Take 1 capsule (30 mg total) by mouth every morning.  Dispense: 30 capsule; Refill: 0    2. Aortic atherosclerosis  Based on last lipids, 10 year ASCVD risk estimated to be 3.4%. Not on a statin.     3. Hypothyroidism due to acquired atrophy of thyroid  Stable, will refill.   - levothyroxine (SYNTHROID) 88 MCG tablet; Take 1 tablet (88 mcg total) by mouth once daily.  Dispense: 30 tablet; Refill: 5      ______________________________________________________________________  Subjective:    Chief Complaint:  Medication refill    HPI:  Nena is a 57 y.o. year old woman here for follow up of ADHD.     Notes that she is getting some benefit from the adderall, but not as good as when she was on the Vyvanse. Notes that it is very helpful for concentration and keeping her organized. Denies any side effects from the medication currently. Appetite has been stable.     Also needs refill of the synthroid. Has been on stable dose for a while.       Medications:  Current Outpatient Prescriptions on File Prior to Visit   Medication Sig Dispense Refill    clonazePAM (KLONOPIN) 0.5 MG tablet Take 0.5 mg by mouth 3 (three) times daily.      COCONUT OIL ORAL Take by mouth.      dextroamphetamine-amphetamine (ADDERALL XR) 30 MG 24 hr capsule Take 1 capsule (30 mg total) by mouth every morning. 30 capsule 0    gabapentin (NEURONTIN) 600 MG tablet Take 1 tablet (600 mg total) by mouth 3 (three)  "times daily. 90 tablet 1    Lactobacillus rhamnosus GG (CULTURELLE) 10 billion cell capsule Take 1 capsule by mouth once daily.      levothyroxine (SYNTHROID) 88 MCG tablet TAKE ONE TABLET BY MOUTH ONCE DAILY 30 tablet 3    loratadine (CLARITIN) 10 mg tablet Take 1 tablet (10 mg total) by mouth once daily. 90 tablet 1    metronidazole 1% (METROGEL) 1 % Gel Apply topically once daily. 60 g 0    pantoprazole (PROTONIX) 20 MG tablet Take 1 tablet (20 mg total) by mouth once daily. 90 tablet 1    triamcinolone acetonide 0.1% (KENALOG) 0.1 % cream Apply topically 2 (two) times daily. 1 Tube 0    azelastine (ASTELIN) 137 mcg (0.1 %) nasal spray 2 sprays (274 mcg total) by Nasal route once daily. 30 mL 12    estradiol (ESTRACE) 0.5 MG tablet Take 1 tablet (0.5 mg total) by mouth once daily. 90 tablet 3    polyethylene glycol (GLYCOLAX) 17 gram/dose powder Take 17 g by mouth once daily. Take 1 capful, mixed in water or juice, every day (or up to 4 times a day if needed) to prevent / relieve constipation. 527 g 11     No current facility-administered medications on file prior to visit.        Review of Systems:  Review of Systems   Constitutional: Negative for chills and fever.   Respiratory: Negative for shortness of breath.    Cardiovascular: Negative for chest pain.   Psychiatric/Behavioral: Positive for decreased concentration.       Past Medical History:  Past Medical History:   Diagnosis Date    ADHD (attention deficit hyperactivity disorder)     Allergic rhinitis due to pollen 8/6/2014    Anxiety     Diverticulitis     Hypothyroidism     Perforated diverticulum of duodenum     Trigeminal neuralgia of left side of face        Objective:    Vitals:  Vitals:    11/14/17 1352   BP: 120/80   Pulse: 88   Temp: 97.9 °F (36.6 °C)   Weight: 75.6 kg (166 lb 10.7 oz)   Height: 5' 7" (1.702 m)   PainSc: 0-No pain       Physical Exam   Constitutional: She is oriented to person, place, and time. She appears " well-developed and well-nourished. No distress.   HENT:   Mouth/Throat: Oropharynx is clear and moist.   Eyes: No scleral icterus.   Cardiovascular: Normal rate and regular rhythm.    No murmur heard.  Pulmonary/Chest: Effort normal and breath sounds normal. No respiratory distress. She has no wheezes.   Musculoskeletal: She exhibits no edema.   Neurological: She is alert and oriented to person, place, and time.   Skin: Skin is warm and dry.   Psychiatric: She has a normal mood and affect. Her behavior is normal.   Vitals reviewed.       Data:    CT from 7/10/15 commented on atherosclerotic calcification of aorta.    Maggi Santana MD  Internal Medicine

## 2017-12-06 DIAGNOSIS — E03.4 HYPOTHYROIDISM DUE TO ACQUIRED ATROPHY OF THYROID: ICD-10-CM

## 2017-12-07 RX ORDER — LEVOTHYROXINE SODIUM 88 UG/1
88 TABLET ORAL DAILY
Qty: 30 TABLET | Refills: 5 | Status: SHIPPED | OUTPATIENT
Start: 2017-12-07 | End: 2018-03-15 | Stop reason: SDUPTHER

## 2018-01-01 NOTE — PATIENT INSTRUCTIONS
Discussed with patient.  Continue gabapentin at its present dosage.  Trial with Lyrica 75 mg twice a day.  Will repeat the MRI scan of the brain, noncontrast and refer her to an [facility] for an evaluation..  We will contact him with results of the MRI scan.    
Passed

## 2018-03-12 DIAGNOSIS — G50.0 IDIOPATHIC TRIGEMINAL NEURALGIA: ICD-10-CM

## 2018-03-12 RX ORDER — GABAPENTIN 600 MG/1
TABLET ORAL
Qty: 90 TABLET | Refills: 1 | Status: SHIPPED | OUTPATIENT
Start: 2018-03-12 | End: 2018-05-25 | Stop reason: SDUPTHER

## 2018-03-15 ENCOUNTER — OFFICE VISIT (OUTPATIENT)
Dept: FAMILY MEDICINE | Facility: CLINIC | Age: 58
End: 2018-03-15
Payer: MEDICARE

## 2018-03-15 VITALS
OXYGEN SATURATION: 99 % | SYSTOLIC BLOOD PRESSURE: 118 MMHG | BODY MASS INDEX: 26.55 KG/M2 | WEIGHT: 169.19 LBS | DIASTOLIC BLOOD PRESSURE: 88 MMHG | TEMPERATURE: 98 F | HEART RATE: 86 BPM | HEIGHT: 67 IN

## 2018-03-15 DIAGNOSIS — I70.0 ATHEROSCLEROSIS OF ABDOMINAL AORTA: ICD-10-CM

## 2018-03-15 DIAGNOSIS — E03.4 HYPOTHYROIDISM DUE TO ACQUIRED ATROPHY OF THYROID: ICD-10-CM

## 2018-03-15 DIAGNOSIS — K21.9 GASTROESOPHAGEAL REFLUX DISEASE WITHOUT ESOPHAGITIS: ICD-10-CM

## 2018-03-15 DIAGNOSIS — F90.2 ATTENTION DEFICIT HYPERACTIVITY DISORDER (ADHD), COMBINED TYPE: Primary | ICD-10-CM

## 2018-03-15 PROBLEM — Z12.11 COLON CANCER SCREENING: Status: RESOLVED | Noted: 2017-08-31 | Resolved: 2018-03-15

## 2018-03-15 PROCEDURE — 99214 OFFICE O/P EST MOD 30 MIN: CPT | Mod: S$GLB,,, | Performed by: NURSE PRACTITIONER

## 2018-03-15 PROCEDURE — 99999 PR PBB SHADOW E&M-EST. PATIENT-LVL V: CPT | Mod: PBBFAC,,, | Performed by: NURSE PRACTITIONER

## 2018-03-15 PROCEDURE — 99499 UNLISTED E&M SERVICE: CPT | Mod: S$GLB,,, | Performed by: NURSE PRACTITIONER

## 2018-03-15 RX ORDER — PANTOPRAZOLE SODIUM 40 MG/1
40 TABLET, DELAYED RELEASE ORAL DAILY
Qty: 90 TABLET | Refills: 1 | Status: SHIPPED | OUTPATIENT
Start: 2018-03-15 | End: 2018-09-10 | Stop reason: SDUPTHER

## 2018-03-15 RX ORDER — DEXTROAMPHETAMINE SACCHARATE, AMPHETAMINE ASPARTATE MONOHYDRATE, DEXTROAMPHETAMINE SULFATE AND AMPHETAMINE SULFATE 7.5; 7.5; 7.5; 7.5 MG/1; MG/1; MG/1; MG/1
30 CAPSULE, EXTENDED RELEASE ORAL EVERY MORNING
Qty: 30 CAPSULE | Refills: 0 | Status: SHIPPED | OUTPATIENT
Start: 2018-04-15 | End: 2018-06-21 | Stop reason: SDUPTHER

## 2018-03-15 RX ORDER — LEVOTHYROXINE SODIUM 88 UG/1
88 TABLET ORAL DAILY
Qty: 30 TABLET | Refills: 5 | Status: SHIPPED | OUTPATIENT
Start: 2018-03-15 | End: 2019-01-23 | Stop reason: SDUPTHER

## 2018-03-15 RX ORDER — DEXTROAMPHETAMINE SACCHARATE, AMPHETAMINE ASPARTATE MONOHYDRATE, DEXTROAMPHETAMINE SULFATE AND AMPHETAMINE SULFATE 7.5; 7.5; 7.5; 7.5 MG/1; MG/1; MG/1; MG/1
30 CAPSULE, EXTENDED RELEASE ORAL EVERY MORNING
Qty: 30 CAPSULE | Refills: 0 | Status: SHIPPED | OUTPATIENT
Start: 2018-03-15 | End: 2018-05-22 | Stop reason: SDUPTHER

## 2018-03-15 RX ORDER — DEXTROAMPHETAMINE SACCHARATE, AMPHETAMINE ASPARTATE MONOHYDRATE, DEXTROAMPHETAMINE SULFATE AND AMPHETAMINE SULFATE 7.5; 7.5; 7.5; 7.5 MG/1; MG/1; MG/1; MG/1
30 CAPSULE, EXTENDED RELEASE ORAL EVERY MORNING
Qty: 30 CAPSULE | Refills: 0 | Status: SHIPPED | OUTPATIENT
Start: 2018-05-15 | End: 2018-06-21 | Stop reason: SDUPTHER

## 2018-03-15 RX ORDER — PANTOPRAZOLE SODIUM 20 MG/1
20 TABLET, DELAYED RELEASE ORAL DAILY
Qty: 90 TABLET | Refills: 1 | Status: SHIPPED | OUTPATIENT
Start: 2018-03-15 | End: 2018-03-15 | Stop reason: SDUPTHER

## 2018-03-15 NOTE — PROGRESS NOTES
Subjective:       Patient ID: Nena Craig is a 58 y.o. female.    Chief Complaint: Medication Refill    HPI     Patient is here today to review treatment for ADD using stimulant medication.  They presently have no new concerns  The medication is effective without adverse side effects  The patient reports good control of concentration and attention.  Completing appropriate daily tasks without difficulty   reviewed and is consistent.     GERD - has been taking Protonix 20 mg, but is unsure of sx relief. Denies hematemesis, hematochezia.     Review of Systems   Constitutional: Negative for chills and fever.   Respiratory: Negative for cough, shortness of breath and wheezing.    Cardiovascular: Negative for chest pain and palpitations.   Skin: Negative for rash and wound.   Psychiatric/Behavioral: Negative for dysphoric mood and sleep disturbance. The patient is not nervous/anxious.        Objective:      Physical Exam   Constitutional: She is oriented to person, place, and time. She appears well-developed and well-nourished.   HENT:   Head: Normocephalic and atraumatic.   Cardiovascular: Normal rate, regular rhythm and normal heart sounds.    No murmur heard.  Pulmonary/Chest: Effort normal and breath sounds normal. No respiratory distress. She has no wheezes. She has no rales.   Musculoskeletal: Normal range of motion. She exhibits no edema, tenderness or deformity.   Neurological: She is alert and oriented to person, place, and time. No cranial nerve deficit.   Skin: Skin is warm and dry.   Psychiatric: She has a normal mood and affect. Her behavior is normal.   Nursing note and vitals reviewed.      Assessment:       1. Attention deficit hyperactivity disorder (ADHD), combined type    2. Gastroesophageal reflux disease without esophagitis    3. Hypothyroidism due to acquired atrophy of thyroid    4. Atherosclerosis of abdominal aorta        Plan:   Nena was seen today for medication refill.    Diagnoses and  all orders for this visit:    Attention deficit hyperactivity disorder (ADHD), combined type  -     dextroamphetamine-amphetamine (ADDERALL XR) 30 MG 24 hr capsule; Take 1 capsule (30 mg total) by mouth every morning.  -     dextroamphetamine-amphetamine (ADDERALL XR) 30 MG 24 hr capsule; Take 1 capsule (30 mg total) by mouth every morning.  -     dextroamphetamine-amphetamine (ADDERALL XR) 30 MG 24 hr capsule; Take 1 capsule (30 mg total) by mouth every morning.  3 months of rx printed. Medication compliance, sx monitoring, prescription responsibility and non-diversion reviewed. Patient aware that I do not provide reprints for lost rx. Had no questions or concerns.\    Gastroesophageal reflux disease without esophagitis  -     EKG 12-lead; Future  -     pantoprazole (PROTONIX) 40 MG tablet; Take 1 tablet (40 mg total) by mouth once daily.  Increase Protonix to 40 mg daily. Dietary and lifestyle modifications     Hypothyroidism due to acquired atrophy of thyroid  -     levothyroxine (SYNTHROID) 88 MCG tablet; Take 1 tablet (88 mcg total) by mouth once daily.  -     TSH; Future  Stable. Continue current regimen update labs. Routine f/u.     Atherosclerosis of abdominal aorta  Noted on abdominal CT 07/2015.   Daily ASA, Dietary and lifestyle modifications

## 2018-05-21 ENCOUNTER — NURSE TRIAGE (OUTPATIENT)
Dept: ADMINISTRATIVE | Facility: CLINIC | Age: 58
End: 2018-05-21

## 2018-05-21 DIAGNOSIS — F90.2 ATTENTION DEFICIT HYPERACTIVITY DISORDER (ADHD), COMBINED TYPE: ICD-10-CM

## 2018-05-21 NOTE — TELEPHONE ENCOUNTER
Please contact - he is upset about adderall prescription - something about JACKELINE number is . Please contact him to advise    Reason for Disposition   Caller requesting a NON-URGENT new prescription or refill and triager unable to refill per unit policy    Protocols used: ST MEDICATION QUESTION CALL-A-AH

## 2018-05-22 RX ORDER — DEXTROAMPHETAMINE SACCHARATE, AMPHETAMINE ASPARTATE MONOHYDRATE, DEXTROAMPHETAMINE SULFATE AND AMPHETAMINE SULFATE 7.5; 7.5; 7.5; 7.5 MG/1; MG/1; MG/1; MG/1
30 CAPSULE, EXTENDED RELEASE ORAL EVERY MORNING
Qty: 30 CAPSULE | Refills: 0 | Status: SHIPPED | OUTPATIENT
Start: 2018-05-22 | End: 2019-11-19

## 2018-05-22 NOTE — TELEPHONE ENCOUNTER
Pt could not fill rx this month due to it being under Cheli REYNOL[facility] JACKELINE number.  Informed pt  that request will be sent to Dr Monahan and pt is due for appt next month for further refills.

## 2018-05-25 DIAGNOSIS — G50.0 IDIOPATHIC TRIGEMINAL NEURALGIA: ICD-10-CM

## 2018-05-25 RX ORDER — GABAPENTIN 600 MG/1
600 TABLET ORAL 3 TIMES DAILY
Qty: 270 TABLET | Refills: 3 | Status: SHIPPED | OUTPATIENT
Start: 2018-05-25 | End: 2020-02-26 | Stop reason: SDUPTHER

## 2018-06-21 ENCOUNTER — OFFICE VISIT (OUTPATIENT)
Dept: OTOLARYNGOLOGY | Facility: CLINIC | Age: 58
End: 2018-06-21
Payer: MEDICARE

## 2018-06-21 VITALS
SYSTOLIC BLOOD PRESSURE: 128 MMHG | TEMPERATURE: 98 F | HEART RATE: 99 BPM | HEIGHT: 67 IN | BODY MASS INDEX: 25.78 KG/M2 | DIASTOLIC BLOOD PRESSURE: 83 MMHG | WEIGHT: 164.25 LBS

## 2018-06-21 DIAGNOSIS — J34.2 NASAL SEPTAL DEVIATION: ICD-10-CM

## 2018-06-21 DIAGNOSIS — G50.1 ATYPICAL FACIAL PAIN: Primary | ICD-10-CM

## 2018-06-21 DIAGNOSIS — J30.9 ALLERGIC RHINITIS, UNSPECIFIED SEASONALITY, UNSPECIFIED TRIGGER: ICD-10-CM

## 2018-06-21 DIAGNOSIS — R43.9 SMELL OR TASTE SENSATION DISTURBANCE: ICD-10-CM

## 2018-06-21 DIAGNOSIS — G50.0 TIC DOULOUREUX: ICD-10-CM

## 2018-06-21 PROCEDURE — 99499 UNLISTED E&M SERVICE: CPT | Mod: S$GLB,,, | Performed by: SPECIALIST

## 2018-06-21 PROCEDURE — 99214 OFFICE O/P EST MOD 30 MIN: CPT | Mod: S$GLB,,, | Performed by: SPECIALIST

## 2018-06-21 PROCEDURE — 3008F BODY MASS INDEX DOCD: CPT | Mod: CPTII,S$GLB,, | Performed by: SPECIALIST

## 2018-06-21 RX ORDER — FLUTICASONE PROPIONATE 50 MCG
2 SPRAY, SUSPENSION (ML) NASAL DAILY
Qty: 1 BOTTLE | Refills: 11 | Status: SHIPPED | OUTPATIENT
Start: 2018-06-21 | End: 2022-04-21 | Stop reason: SDUPTHER

## 2018-06-22 NOTE — PROGRESS NOTES
Subjective:       Patient ID: Nena Craig is a 58 y.o. female.    Chief Complaint: Otalgia    The patient has an extremely complex history.  Several years ago she fell while working for an [employer].  She sustained a low fracture that requires surgery.  Her postoperative capabilities were not adequate for her to continue her work, so she was discharged on a medical basis.  She subsequently developed cheek and facial pain and had some dental problems on the left upper arch.  She had a chronic periapical abscess that ultimately was drained via..A root canal.  During this time she developed chronic left (ipsilateral) severe facial pain that was ultimately diagnosed as trigeminal neuralgia.  She continues to have episodes where she will have swelling in the left cheek followed by drainage of some fluid from the area of the tooth extraction.  The swelling has been relieved by undergoing periodic lymphatic massages.  Her left facial pain continues intermittently as to swelling in her face.  She has undergone ALLERGY testing which revealed few positives; however, whenever she goes outside of except be either leaves are grasped she develops itching of her hands, itching and a scratchy sensation on her throat that is subsequently causes facial swelling on the left and a discharge from the upper teeth on the left      Review of Systems   Constitutional: Positive for fatigue. Negative for activity change, appetite change, chills, fever and unexpected weight change.   HENT: Positive for congestion, facial swelling, postnasal drip, rhinorrhea, sinus pain, sinus pressure and sore throat. Negative for ear discharge, ear pain, hearing loss, mouth sores, sneezing, tinnitus, trouble swallowing and voice change.    Eyes: Negative for photophobia, pain, discharge, redness, itching and visual disturbance.   Respiratory: Negative for apnea, cough, choking, shortness of breath and wheezing.    Cardiovascular: Negative for chest pain  and palpitations.   Gastrointestinal: Negative for abdominal distention, abdominal pain, nausea and vomiting.   Musculoskeletal: Negative for arthralgias, myalgias, neck pain and neck stiffness.   Skin: Negative.  Negative for color change, pallor and rash.   Allergic/Immunologic: Negative for environmental allergies, food allergies and immunocompromised state.   Neurological: Positive for facial asymmetry and headaches. Negative for dizziness, speech difficulty, weakness, light-headedness and numbness.   Hematological: Negative for adenopathy. Does not bruise/bleed easily.   Psychiatric/Behavioral: Negative for confusion, decreased concentration and sleep disturbance.       Objective:      Physical Exam   Constitutional: She is oriented to person, place, and time. She appears well-developed and well-nourished. She is cooperative.   HENT:   Head: Normocephalic.   Right Ear: External ear and ear canal normal. Tympanic membrane is retracted.   Left Ear: External ear and ear canal normal. Tympanic membrane is retracted.   Nose: Mucosal edema (cyanotic, boggy inferior turbinates bilaterally), rhinorrhea (clear mucus bilaterally) and septal deviation present.   Mouth/Throat: Uvula is midline, oropharynx is clear and moist and mucous membranes are normal. No oral lesions. Abnormal dentition.   Eyes: EOM and lids are normal. Pupils are equal, round, and reactive to light. Right eye exhibits no discharge and no exudate. Left eye exhibits no discharge and no exudate. Right conjunctiva is injected. Left conjunctiva is injected.   Neck: Trachea normal and normal range of motion. No muscular tenderness present. No tracheal deviation present. No thyroid mass and no thyromegaly present.   Cardiovascular: Normal rate, regular rhythm, normal heart sounds and normal pulses.    Pulmonary/Chest: Effort normal and breath sounds normal. No stridor. She has no decreased breath sounds. She has no wheezes. She has no rhonchi. She has no  rales.   Abdominal: Soft. Bowel sounds are normal. There is no tenderness.   Musculoskeletal: Normal range of motion.   Lymphadenopathy:        Head (right side): No submental, no submandibular, no preauricular, no posterior auricular and no occipital adenopathy present.        Head (left side): No submental, no submandibular, no preauricular, no posterior auricular and no occipital adenopathy present.     She has no cervical adenopathy.   Neurological: She is alert and oriented to person, place, and time. She has normal strength. No cranial nerve deficit or sensory deficit. Gait normal.   Skin: Skin is warm and dry. No petechiae and no rash noted. No cyanosis. Nails show no clubbing.   Psychiatric: She has a normal mood and affect. Her speech is normal and behavior is normal. Judgment and thought content normal. Cognition and memory are normal.       Assessment:       1. Atypical facial pain    2. Allergic rhinitis, unspecified seasonality, unspecified trigger    3. Smell or taste sensation disturbance    4. Tic douloureux    5. Nasal septal deviation        Plan:       I will schedule patient for a CT scan of the sinuses without IV contrast.  She takes Claritin on a daily basis.  I'm having her add a nasal steroid to the regimen.  I'll recheck her in 3 weeks.

## 2018-06-26 ENCOUNTER — HOSPITAL ENCOUNTER (OUTPATIENT)
Dept: RADIOLOGY | Facility: HOSPITAL | Age: 58
Discharge: HOME | End: 2018-06-26
Attending: SPECIALIST
Payer: MEDICARE

## 2018-06-26 DIAGNOSIS — G50.1 ATYPICAL FACIAL PAIN: ICD-10-CM

## 2018-06-26 DIAGNOSIS — R43.9 SMELL OR TASTE SENSATION DISTURBANCE: ICD-10-CM

## 2018-06-26 PROCEDURE — 70486 CT MAXILLOFACIAL W/O DYE: CPT | Mod: 26,,, | Performed by: RADIOLOGY

## 2018-06-26 PROCEDURE — 70486 CT MAXILLOFACIAL W/O DYE: CPT | Mod: TC,PO

## 2018-07-09 ENCOUNTER — TELEPHONE (OUTPATIENT)
Dept: OTOLARYNGOLOGY | Facility: CLINIC | Age: 58
End: 2018-07-09

## 2018-07-09 NOTE — TELEPHONE ENCOUNTER
----- Message from Cherise Flores sent at 7/9/2018 10:38 AM CDT -----  Contact: JOHN BENSON [[id number]]    Name of Who is Calling: JOHN BENSON [[id number]]      What is the request in detail: Returning a call.      Can the clinic reply by [facility]: no      What Number to Call Back if not in [facility]: [phone] (home)

## 2018-07-09 NOTE — TELEPHONE ENCOUNTER
Called and spoke with pt today letting her know about her MRI results per Dr. Saldana. Pt states thanks, and she will see him on July 24, 2018 if not sooner.

## 2018-07-09 NOTE — TELEPHONE ENCOUNTER
----- Message from JORGE Saldana MD sent at 7/8/2018  9:52 PM CDT -----  Please notify patient that CT findings were mildly abnormal. There were no significant changesfrom the CT scan performed last year.  I will discuss the results with them at their next visit.  If they do not have a visit they should schedule one.    Send copy of CT scan to PCP

## 2018-07-09 NOTE — TELEPHONE ENCOUNTER
Attempted to call pt today letting her know per Dr. Saldana have test results. Please call the office at [phone].

## 2018-07-10 ENCOUNTER — PES CALL (OUTPATIENT)
Dept: ADMINISTRATIVE | Facility: CLINIC | Age: 58
End: 2018-07-10

## 2018-07-24 ENCOUNTER — OFFICE VISIT (OUTPATIENT)
Dept: OTOLARYNGOLOGY | Facility: CLINIC | Age: 58
End: 2018-07-24
Payer: MEDICARE

## 2018-07-24 VITALS
HEIGHT: 68 IN | HEART RATE: 74 BPM | BODY MASS INDEX: 24.91 KG/M2 | WEIGHT: 164.38 LBS | DIASTOLIC BLOOD PRESSURE: 87 MMHG | TEMPERATURE: 99 F | SYSTOLIC BLOOD PRESSURE: 131 MMHG

## 2018-07-24 DIAGNOSIS — T22.219A: ICD-10-CM

## 2018-07-24 DIAGNOSIS — J30.9 ALLERGIC RHINITIS, UNSPECIFIED SEASONALITY, UNSPECIFIED TRIGGER: ICD-10-CM

## 2018-07-24 DIAGNOSIS — G50.0 IDIOPATHIC TRIGEMINAL NEURALGIA: Primary | ICD-10-CM

## 2018-07-24 DIAGNOSIS — J34.2 NASAL SEPTAL DEVIATION: ICD-10-CM

## 2018-07-24 PROCEDURE — 3008F BODY MASS INDEX DOCD: CPT | Mod: CPTII,S$GLB,, | Performed by: SPECIALIST

## 2018-07-24 PROCEDURE — 99214 OFFICE O/P EST MOD 30 MIN: CPT | Mod: S$GLB,,, | Performed by: SPECIALIST

## 2018-07-24 RX ORDER — MONTELUKAST SODIUM 10 MG/1
10 TABLET ORAL NIGHTLY
Qty: 30 TABLET | Refills: 11 | Status: SHIPPED | OUTPATIENT
Start: 2018-07-24 | End: 2018-08-23
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
## 2018-08-21 ENCOUNTER — OFFICE VISIT (OUTPATIENT)
Dept: OTOLARYNGOLOGY | Facility: CLINIC | Age: 58
End: 2018-08-21
Payer: MEDICARE

## 2018-08-21 VITALS
BODY MASS INDEX: 24.92 KG/M2 | WEIGHT: 164.44 LBS | HEIGHT: 68 IN | TEMPERATURE: 98 F | HEART RATE: 87 BPM | SYSTOLIC BLOOD PRESSURE: 114 MMHG | DIASTOLIC BLOOD PRESSURE: 81 MMHG

## 2018-08-21 DIAGNOSIS — T22.20XD SECOND DEGREE BURN OF ARM, SUBSEQUENT ENCOUNTER: ICD-10-CM

## 2018-08-21 DIAGNOSIS — J30.1 NON-SEASONAL ALLERGIC RHINITIS DUE TO POLLEN: Primary | ICD-10-CM

## 2018-08-21 DIAGNOSIS — J34.2 NASAL SEPTAL DEVIATION: ICD-10-CM

## 2018-08-21 DIAGNOSIS — G50.0 IDIOPATHIC TRIGEMINAL NEURALGIA: ICD-10-CM

## 2018-08-21 PROCEDURE — 99213 OFFICE O/P EST LOW 20 MIN: CPT | Mod: S$GLB,,, | Performed by: SPECIALIST

## 2018-08-21 PROCEDURE — 3008F BODY MASS INDEX DOCD: CPT | Mod: CPTII,S$GLB,, | Performed by: SPECIALIST
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
## 2018-09-10 DIAGNOSIS — K21.9 GASTROESOPHAGEAL REFLUX DISEASE WITHOUT ESOPHAGITIS: ICD-10-CM

## 2018-09-10 RX ORDER — PANTOPRAZOLE SODIUM 40 MG/1
40 TABLET, DELAYED RELEASE ORAL DAILY
Qty: 90 TABLET | Refills: 1 | Status: SHIPPED | OUTPATIENT
Start: 2018-09-10 | End: 2019-06-28 | Stop reason: SDUPTHER

## 2018-09-20 ENCOUNTER — PATIENT MESSAGE (OUTPATIENT)
Dept: OTOLARYNGOLOGY | Facility: CLINIC | Age: 58
End: 2018-09-20

## 2018-09-21 ENCOUNTER — OFFICE VISIT (OUTPATIENT)
Dept: OTOLARYNGOLOGY | Facility: CLINIC | Age: 58
End: 2018-09-21
Payer: MEDICARE

## 2018-09-21 VITALS
HEART RATE: 86 BPM | WEIGHT: 164.44 LBS | DIASTOLIC BLOOD PRESSURE: 84 MMHG | SYSTOLIC BLOOD PRESSURE: 138 MMHG | HEIGHT: 68 IN | BODY MASS INDEX: 24.92 KG/M2

## 2018-09-21 DIAGNOSIS — Z91.018 FOOD ALLERGY: ICD-10-CM

## 2018-09-21 DIAGNOSIS — J30.1 NON-SEASONAL ALLERGIC RHINITIS DUE TO POLLEN: Primary | ICD-10-CM

## 2018-09-21 DIAGNOSIS — J30.81 ALLERGY TO ANIMAL DANDER: ICD-10-CM

## 2018-09-21 DIAGNOSIS — Z91.048 ALLERGY TO MOLD: ICD-10-CM

## 2018-09-21 DIAGNOSIS — G50.0 IDIOPATHIC TRIGEMINAL NEURALGIA: ICD-10-CM

## 2018-09-21 DIAGNOSIS — J30.89 ALLERGY TO DUST: ICD-10-CM

## 2018-09-21 DIAGNOSIS — J30.1 ALLERGIC RHINITIS DUE TO POLLEN, UNSPECIFIED SEASONALITY: ICD-10-CM

## 2018-09-21 PROCEDURE — 96372 THER/PROPH/DIAG INJ SC/IM: CPT | Mod: 59,S$GLB,, | Performed by: SPECIALIST

## 2018-09-21 PROCEDURE — 95004 PERQ TESTS W/ALRGNC XTRCS: CPT | Mod: S$GLB,,, | Performed by: SPECIALIST

## 2018-09-21 PROCEDURE — 99214 OFFICE O/P EST MOD 30 MIN: CPT | Mod: 25,S$GLB,, | Performed by: SPECIALIST

## 2018-09-21 PROCEDURE — 3008F BODY MASS INDEX DOCD: CPT | Mod: CPTII,S$GLB,, | Performed by: SPECIALIST

## 2018-09-21 RX ORDER — CYANOCOBALAMIN 1000 UG/ML
1000 INJECTION, SOLUTION INTRAMUSCULAR; SUBCUTANEOUS
Status: COMPLETED | OUTPATIENT
Start: 2018-09-21 | End: 2018-09-21

## 2018-09-21 RX ORDER — EPINEPHRINE 0.3 MG/.3ML
1 INJECTION SUBCUTANEOUS ONCE
Qty: 2 DEVICE | Refills: 5 | Status: SHIPPED | OUTPATIENT
Start: 2018-09-21 | End: 2019-11-19

## 2018-09-21 RX ORDER — BETAMETHASONE SODIUM PHOSPHATE AND BETAMETHASONE ACETATE 3; 3 MG/ML; MG/ML
6 INJECTION, SUSPENSION INTRA-ARTICULAR; INTRALESIONAL; INTRAMUSCULAR; SOFT TISSUE
Status: COMPLETED | OUTPATIENT
Start: 2018-09-21 | End: 2018-09-21

## 2018-09-21 RX ADMIN — CYANOCOBALAMIN 1000 MCG: 1000 INJECTION, SOLUTION INTRAMUSCULAR; SUBCUTANEOUS at 11:09

## 2018-09-21 RX ADMIN — BETAMETHASONE SODIUM PHOSPHATE AND BETAMETHASONE ACETATE 6 MG: 3; 3 INJECTION, SUSPENSION INTRA-ARTICULAR; INTRALESIONAL; INTRAMUSCULAR; SOFT TISSUE at 11:09

## 2018-09-21 NOTE — PATIENT INSTRUCTIONS
Food Allergy  The best way to deal with food allergies is to avoid the foods you are allergic to. Understand and be aware of the foods that you have reacted to. Also be cautious of foods or dishes that may have flavorings or small amounts of foods that you are allergic to.  Symptoms of food allergy may begin within minutes, but can start 2 hours after eating or later. Common symptoms can include:  · Nausea  · Vomiting  · Diarrhea or stomach cramps  · Iitchy rash (hives)  · Swelling of the eyes, lips, face or tongue  · Wheezing  · Difficulty breathing or swallowing  · Throat tightness  · Dizziness or fainting  This kind of allergic reaction, called anaphylaxis, can be life-threatening. In mild and moderate cases the symptoms usually begin improving within 6 to 24 hours. People with certain health problems, such as asthma and eczema, may be more likely to have food allergies. Foods that people are most commonly allergic to are milk or dairy products, eggs, peanuts, tree nuts, soy, shellfish, and wheat. Remember that any food can cause a reaction. Treatment for a severe allergic reaction can include epinephrine. If you have a severe food allergy, or have had severe allergic reactions even if you don't know the cause, you should carry this medicine with you for self-injection. It is available by prescription. It is also available in a lower dose form for children from your healthcare provider.  Home care  The following guidelines will help you care for yourself at home:  · If your symptoms were moderate to severe, they may fluctuate for the next 24 hours. It may be best to rest at home during that time.  · Avoid tobacco and alcohol because they can make symptoms worse. They can also interact with the medicines you are taking to treat the allergic reaction.  · If you know what foods caused your reaction today, avoid them in the future. The next and each reaction after this may make your body more sensitive to these  "foods. This can cause a worse reaction later. Tell your family members, friends, and doctors about your food allergy, especially in an emergency situation since they need to know how to give you epinephrine if you are unable to. This can be life-saving.  · Learn how to read food labels so you can check for the substance that you reacted to. If a food does not have a label, it is best to avoid it. When in restaurants, ask about ingredients and tell the staff, "If I eat a dish containing (food you are allergic to), I could have a severe allergic reaction."  · If your reaction was severe, get a medical alert bracelet or necklace that notes your allergy.  · If epinephrine is prescribed, carry it with you at all times. Learn how to use the device. If you begin to feel the symptoms of another reaction, use the epinephrine to inject yourself right away, and call 911. Dont wait until symptoms become severe.  · Oral allergy medicines (diphenhydramine) are antihistamines that can help with the reaction. You can buy them at any pharmacy or supermarket. They come in liquids, pills, or capsules. Unless your doctor gave you a prescription antihistamine, you can use these medicines to ease itching. Allergy medicines can make you sleepy, so be careful, especially when driving or working. For this reason, you may want to use lower doses during the day and save the higher doses for bedtime. Don't use diphenhydramine if you have glaucoma or if you are a man with trouble urinating because of an enlarged prostate.  · If allergy medicines with diphenhydramine make you too sleepy, talk with your healthcare provider. He or she can recommend an over-the counter antihistamine that won't make you sleepy. These may not work as well, though.  Follow-up care  Follow up with your healthcare provider if your symptoms don't get better over the next 2 to 3 days. If you don't know what caused this reaction, your provider may order skin tests and " blood tests, or an elimination diet. You can find an allergy specialist in your area by contacting:  · American Academy of Allergy, Asthma & Immunology, www.aaaai.org  · American College of Allergy, Asthma & Immunology, www.acaai.org  When to seek medical advice  Call your [facility] provider right away if any of these occur:  · Your symptoms get worse  · New or worse swelling in the face, eyelids, lips, mouth, throat, or tongue  · Mild trouble swallowing, breathing, or wheezing  · Fever of 100.4°F (38.0°C) or higher, or as directed by your health care provider  · Severe abdominal pain  · Persistent vomiting (unable to keep liquids down) or constant diarrhea  · Blood or mucus in the stool  Call 911  If any of these occur, give yourself injectable epinephrine and call 911:  · Significant trouble breathing, talking, or swallowing  · Any change in level of alertness or unconsciousness, including dizziness, weakness, or fainting  · Cool, moist skin  · Fast, weak hearbeat  · Severe wheezing  · Hives  · Severe swelling of the face, tongue, or lips  · Drooling  · Vomiting that happens soon after eating a food you think you are allergic to  · Explosive diarrhea  Date Last Reviewed: 1/11/2016  © 4258-0047 [employer]. [address] [place] [address], [place] [address]. All rights reserved. This information is not intended as a substitute for professional medical care. Always follow your healthcare professional's instructions.        Controlling Allergens: Pollen     Keep windows closed, especially when pollen counts are high, and use air conditioning instead.   Constant exposure to allergens means constant allergy symptoms. Thats why it's important to control or avoid the allergens that cause your symptoms. If you are allergic to pollen, the tips below may help. The more you do to keep from allergens, the better youll feel.  Pollen allergy  The pollen that causes allergies is usually not the pollen carried from  plant to plant by insects such as butterflies and bees. The types of pollen that most commonly cause allergies are made by plants (trees, grasses, and weeds) that do not have flowers. These plants make small, light, dry pollen granules that are blown from plant to plant by the wind.  Controlling pollen  Below are some tips to help you limit your exposure to pollen:  · Check pollen counts and avoid spending a lot of time outdoors when counts are high. Pollen counts tend to be higher during warm, dry weather. They also tend to be higher during early morning and late afternoon hours. In some areas, daily pollen counts are reported in the paper and on the radio.  · After spending time outdoors, bathe or shower, wash your hair, and change your clothes.  · Stay indoors as much as you can on windy days.  · Keep windows closed and air conditioning on, if possible, in your car and your home.  · Have someone else do gardening, yard work, or other outdoor chores. Masks are available if you have to spend time outdoors.  · When your allergies are at their worst each year, try getting away to a place where your allergies wont bother you as much. This might be a time to try to plan a vacation or visit a friend or relative.  · Talk with your healthcare provider about medicines that can help. And, whether or not you might benefit from seeing an allergist.  Pollen allergy is seasonal  People have allergies only when the pollen to which they are allergic is in the air. Each plant pollinates more or less the same from year to year. Exactly when a plant starts to pollinate seems to depend on geographical location--rather than on the weather.   Date Last Reviewed: 9/1/2016  © 3652-3249 [employer]. [address] [place] [address]. All rights reserved. This information is not intended as a substitute for professional medical care. Always follow your healthcare professional's instructions.        Controlling  Allergens: Pets  Constant exposure to allergens means constant allergy symptoms. Thats why controlling or avoiding the allergens that cause your symptoms is an important part of your treatment. If you are allergic to pets, the tips below may help lessen your exposure.     If fur or feathers cause allergies, a fish can be a good pet choice.   Pet allergies  Many people think that pet allergy is caused by the fur of cats and dogs. But researchers have found that the major allergens are proteins made by oil glands in the animals skin. These proteins are shed in flakes of skin called dander. Allergy-causing proteins in saliva stick to the fur when the animal cleans itself. And urine contains allergy-causing proteins. Cats tend to be more likely than dogs to cause allergic reactions--this may be because they lick themselves more, may be held more, and may spend more time indoors. Guinea pigs, mice, and rats can also cause allergies.  Controlling animal allergens  The best way to avoid animal allergens is to not have a pet. If you already have a pet and want to keep it, try to reduce your exposure as much as possible. These tips may help:  · Whenever possible, keep pets outdoors.  This doesn't keep pet dander from getting into your home on clothing or shoes.  · Never let pets into your bedroom or on your bed.  · Try to keep pets off sofas, chairs, rugs, and carpeting. Also, consider removing carpets.  · Use an air-cleaning unit with a HEPA filter--especially in the bedroom.  · Use filter bags or vacuums designed to lessen allergens.  · Wash your hands after you touch a pet, and try to keep pets away from your face.  · Brush and bathe your pet often. Bathing pets helps lessen dander. Bathing also washes other allergens like dust, mold, and pollen off the animals fur.  Date Last Reviewed: 9/1/2016  © 0573-6842 [employer]. [address] [place] [address], [place] [address]. All rights reserved. This information is  not intended as a substitute for professional medical care. Always follow your healthcare professional's instructions.        Controlling Allergens: Mold  Constant exposure to allergens means constant allergy symptoms. That's why controlling or avoiding the allergens that cause your symptoms is an important part of your treatment. If you are allergic to mold, the tips below may help lessen your exposure.     Keep damp areas clean to help prevent mold from growing.   Mold allergy  Mold allergy is actually from the spores that the molds release. Spores are microscopic seed particles that travel through the air. Molds grow best in dark, damp places. Outside, mold grows on rotting logs, wet leaves, as well as on certain grasses and weeds. In the home, mold commonly grows in or on the following:  · Damp basements and closets  · Bathrooms (especially shower stalls)  · Places where fresh food is stored  · Refrigerator drip trays  · House plants  · Air conditioners  · Humidifiers  · Garbage cans or waste baskets  · Mattresses  · Upholstered furniture  · Old foam rubber pillows  Controlling mold  Try the following:  · Watch the newspaper or local news for mold counts. When they are high, stay inside as much as possible.  · Drain wet areas of your yard, and clean up leaves and weeds before they begin to rot. If you compost, keep compost piles away from the house. Ask someone else to do these things for you.  · If you are outside when mold counts are high, bathe, wash your hair, and change your clothes afterwards.  · Use products with bleach to clean the shower or tub. Also clean the shower curtain.  · Fix leaky faucets or leaks in the roof right away.  · While bathing or showering, leave a window open or use a fan.  · If your house is damp, use a dehumidifier. Empty once a day.  Date Last Reviewed: 9/1/2016  © 4822-8642 [employer]. [address] [place] [address], [place] [address]. All rights reserved. This information  is not intended as a substitute for professional medical care. Always follow your healthcare professional's instructions.        Controlling Allergens: In the Home  Even a clean home can be full of allergens, so take a moment to see what you can do to cut down on allergens in each room of your home. Try to avoid things like cigarette smoke and perfume. They can irritate your eyes, nose, throat, and lungs and make your allergies worse.  · Buy an air purifier with a HEPA filter. Look in consumer magazines for recommendations. Avoid vaporizers and humidifiers, since they encourage mold and dust-mite growth.  · Use shades or vertical blinds instead of horizontal blinds, which collect dust. Replace drapes with curtains that can be washed regularly.  · Enclose mattresses, box springs, and pillows in allergy-proof casings. Use washable blankets and quilts. Avoid feather pillows, down comforters, and wool blankets.  · Avoid dust-catching clutter. Have enclosed places to keep books, toys, and clothes. Keep closet doors closed.  · Use washable throw rugs wherever possible, or have bare floors.  · Put filters over forced-air heating vents. Change the filters regularly.  · Keep your car clean. Vacuum the seats and carpets regularly. If you have air conditioning, use it instead of opening the windows.  · Keep rain gutters clean. Remove leaves and debris that can grow mold.  · Check stored food for spoilage and mold growth. Clean up spills right away.  · Don't let wet clothing sit and grow mold. And don't hang clothes outside to dry where they can collect airborne pollen. Dry clothing immediately in a clothes dryer that's vented to the outside.  · Install a fan to keep the bathroom well ventilated.        Avoid yard work and pulling weeds. These and other outdoor activities increase your exposure to pollen. If thats not possible, wear a filter mask. When youre done, bathe, wash your hair, and change your clothes.      Date Last  Reviewed: 9/1/2016  © 1530-0361 [employer]. [address] [place] [address], [place] [address]. All rights reserved. This information is not intended as a substitute for professional medical care. Always follow your healthcare professional's instructions.        Controlling Allergens: Dust Mites  Constant exposure to allergens means constant allergy symptoms. Thats why controlling or avoiding the allergens that cause your symptoms is an important part of your treatment. If you are allergic to dust mites, the tips below can help to lessen your exposure to dust mites.     Wash all bedding in hot water.   Dust mite allergy  Dust mites are a common cause of nasal allergies. These mites are tiny organisms that live in bedding, upholstered furniture, and carpet. They live in warm, humid conditions. House-dust mites are almost impossible to get rid of. But you can keep them under control.  Make changes to your home  Some furniture, like sofas and chairs, hold dust mites. To lessen the problem:  · Choose nonfabric upholstery, like leather or vinyl.  · Replace horizontal blinds with pull-down shades or vertical blinds.  · Use washable curtains instead of heavy drapes.  · Have as little carpeting as possible.  · Cover your mattress, box spring, and pillows in allergy-proof casings.  Housecleaning  Here are some tips:  · Wash sheets, blankets, and mattress pads every 1 to 2 weeks in hot water (at least 130°F).  · Remove stuffed animals and other things that collect dust, such as wall hangings, knickknacks, and books--especially in the bedroom.  · Dust your home every week with a damp cloth. Vacuum once a week. Use HEPA (high efficiency particulate air) filters or double-ply bags in the vacuum . Or, use a vacuum designed to lessen allergens.  · If someone else cant dust and vacuum for you, wearing a filter mask may help.  Reduce indoor humidity  Dust mites need moist air to live. Use a dehumidifier to reduce air  moisture. Dont use humidifiers, or vaporizers.  Talk with your healthcare provider about other ways to reduce dust in your home. Ask about medicines that can help with your allergy symptoms.  Date Last Reviewed: 9/1/2016  © 4891-7772 [employer]. [address] [place] [address], [place] [address]. All rights reserved. This information is not intended as a substitute for professional medical care. Always follow your healthcare professional's instructions.
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
## 2018-10-29 ENCOUNTER — OFFICE VISIT (OUTPATIENT)
Dept: OTOLARYNGOLOGY | Facility: CLINIC | Age: 58
End: 2018-10-29
Payer: MEDICARE

## 2018-10-29 VITALS
SYSTOLIC BLOOD PRESSURE: 121 MMHG | HEIGHT: 66 IN | WEIGHT: 160.63 LBS | BODY MASS INDEX: 25.81 KG/M2 | TEMPERATURE: 98 F | HEART RATE: 90 BPM | DIASTOLIC BLOOD PRESSURE: 80 MMHG

## 2018-10-29 DIAGNOSIS — R60.9 EDEMA, UNSPECIFIED TYPE: ICD-10-CM

## 2018-10-29 DIAGNOSIS — J34.2 NASAL SEPTAL DEVIATION: ICD-10-CM

## 2018-10-29 DIAGNOSIS — J30.9 ALLERGIC RHINITIS, UNSPECIFIED SEASONALITY, UNSPECIFIED TRIGGER: Primary | ICD-10-CM

## 2018-10-29 DIAGNOSIS — B37.0 ORAL CANDIDIASIS: ICD-10-CM

## 2018-10-29 DIAGNOSIS — R51.9 NONINTRACTABLE EPISODIC HEADACHE, UNSPECIFIED HEADACHE TYPE: ICD-10-CM

## 2018-10-29 PROCEDURE — 3008F BODY MASS INDEX DOCD: CPT | Mod: CPTII,S$GLB,, | Performed by: SPECIALIST

## 2018-10-29 PROCEDURE — 99214 OFFICE O/P EST MOD 30 MIN: CPT | Mod: 25,S$GLB,, | Performed by: SPECIALIST

## 2018-10-29 PROCEDURE — 96372 THER/PROPH/DIAG INJ SC/IM: CPT | Mod: S$GLB,,, | Performed by: SPECIALIST

## 2018-10-29 RX ORDER — CLOTRIMAZOLE 10 MG/1
10 LOZENGE ORAL; TOPICAL
Qty: 70 TABLET | Refills: 3 | Status: SHIPPED | OUTPATIENT
Start: 2018-10-29 | End: 2018-11-28

## 2018-10-29 RX ORDER — CYANOCOBALAMIN 1000 UG/ML
1000 INJECTION, SOLUTION INTRAMUSCULAR; SUBCUTANEOUS
Status: COMPLETED | OUTPATIENT
Start: 2018-10-29 | End: 2018-10-29

## 2018-10-29 RX ORDER — TRIAMCINOLONE ACETONIDE 40 MG/ML
40 INJECTION, SUSPENSION INTRA-ARTICULAR; INTRAMUSCULAR
Status: COMPLETED | OUTPATIENT
Start: 2018-10-29 | End: 2018-10-29

## 2018-10-29 RX ORDER — MONTELUKAST SODIUM 10 MG/1
10 TABLET ORAL NIGHTLY
Qty: 30 TABLET | Refills: 11 | Status: SHIPPED | OUTPATIENT
Start: 2018-10-29 | End: 2018-11-28

## 2018-10-29 RX ADMIN — TRIAMCINOLONE ACETONIDE 40 MG: 40 INJECTION, SUSPENSION INTRA-ARTICULAR; INTRAMUSCULAR at 11:10

## 2018-10-29 RX ADMIN — CYANOCOBALAMIN 1000 MCG: 1000 INJECTION, SOLUTION INTRAMUSCULAR; SUBCUTANEOUS at 11:10

## 2018-10-29 NOTE — PROGRESS NOTES
Informed patient of medication and side effects. Advised to wait at least 15 min after injection to monitor for adverse reactions. Verbalized understanding. Injection x2 given. Tolerated well.

## 2018-10-31 NOTE — PROGRESS NOTES
"Subjective:       Patient ID: Nena Craig is a 58 y.o. female.    Chief Complaint: Follow-up (with non seasonal allergic rhinitis due t pollen)    The patient is having issues regarding congestion and soft tissue swelling.  She worked outside in her garden 2 days ago and subsequently developed congestion and postnasal drip.  She has swelling and stiffness in her hands and legs.  She finds that whenever she works outside she develops edema and "puffiness".  She has not yet started her immunotherapy injections.  She is having some white coating and a bad taste in her mouth.  She has been using Singulair, Allegra and Flonase routinely and supplements at with Benadryl when needed.      Review of Systems   Constitutional: Positive for fatigue. Negative for activity change, appetite change, chills, fever and unexpected weight change.   HENT: Positive for congestion, mouth sores, postnasal drip, rhinorrhea, sinus pressure, sinus pain and sore throat. Negative for ear discharge, ear pain, facial swelling, hearing loss, sneezing, tinnitus, trouble swallowing and voice change.    Eyes: Negative for photophobia, pain, discharge, redness, itching and visual disturbance.   Respiratory: Positive for cough. Negative for apnea, choking, shortness of breath and wheezing.    Cardiovascular: Negative for chest pain and palpitations.   Gastrointestinal: Negative for abdominal distention, abdominal pain, nausea and vomiting.   Musculoskeletal: Negative for arthralgias, myalgias, neck pain and neck stiffness.   Skin: Negative.  Negative for color change, pallor and rash.   Allergic/Immunologic: Positive for environmental allergies. Negative for food allergies and immunocompromised state.   Neurological: Positive for headaches. Negative for dizziness, facial asymmetry, speech difficulty, weakness, light-headedness and numbness.   Hematological: Negative for adenopathy. Does not bruise/bleed easily.   Psychiatric/Behavioral: Negative " for confusion, decreased concentration and sleep disturbance.       Objective:      Physical Exam   Constitutional: She is oriented to person, place, and time. She appears well-developed and well-nourished. She is cooperative.   HENT:   Head: Normocephalic.   Right Ear: External ear and ear canal normal. Tympanic membrane is retracted.   Left Ear: External ear and ear canal normal. Tympanic membrane is retracted.   Nose: Mucosal edema (cyanotic, boggy inferior turbinates bilaterally), rhinorrhea (clear mucus bilaterally) and septal deviation (To the right) present.   Mouth/Throat: Uvula is midline, oropharynx is clear and moist and mucous membranes are normal. Oral lesions ( minimal white coating on the dorsum of the tongue with erythema of the oral mucosa) present.   Eyes: EOM and lids are normal. Pupils are equal, round, and reactive to light. Right eye exhibits no discharge and no exudate. Left eye exhibits no discharge and no exudate. Right conjunctiva is injected. Left conjunctiva is injected.   Neck: Trachea normal and normal range of motion. No muscular tenderness present. No tracheal deviation present. No thyroid mass and no thyromegaly present.   Cardiovascular: Normal rate, regular rhythm, normal heart sounds and normal pulses.   Pulmonary/Chest: Effort normal and breath sounds normal. No stridor. She has no decreased breath sounds. She has no wheezes. She has no rhonchi. She has no rales.   Abdominal: Soft. Bowel sounds are normal. There is no tenderness.   Musculoskeletal: Normal range of motion.   Lower [id number] pitting edema pretibial early bilaterally   Lymphadenopathy:        Head (right side): No submental, no submandibular, no preauricular, no posterior auricular and no occipital adenopathy present.        Head (left side): No submental, no submandibular, no preauricular, no posterior auricular and no occipital adenopathy present.     She has no cervical adenopathy.   Neurological: She is  alert and oriented to person, place, and time. She has normal strength. No cranial nerve deficit or sensory deficit. Gait normal.   Skin: Skin is warm and dry. No petechiae and no rash noted. No cyanosis. Nails show no clubbing.   Psychiatric: She has a normal mood and affect. Her speech is normal and behavior is normal. Judgment and thought content normal. Cognition and memory are normal.       Assessment:       1. Allergic rhinitis, unspecified seasonality, unspecified trigger    2. Nasal septal deviation    3. Nonintractable episodic headache, unspecified headache type    4. Oral candidiasis    5. Edema, unspecified type        Plan:       I will have the patient use the Mycelex for minimum of a week.  I have given her a steroid injection today with a B12 injection.  I will recheck her in 4 weeks.  She will start with her immunotherapy injections as soon as they arrive.    I have advised that she use a respirator rated for pollen and mold spores whenever she works outside in garden.  She should continue using gloves whenever working outside.

## 2018-11-08 ENCOUNTER — TELEPHONE (OUTPATIENT)
Dept: OTOLARYNGOLOGY | Facility: CLINIC | Age: 58
End: 2018-11-08

## 2018-11-08 NOTE — TELEPHONE ENCOUNTER
Informed pt that we received allergy vials and she can start her allergy injections. Appointment scheduled for 11/13/2018.        ----- Message from Madie Castellon sent at 11/8/2018 11:56 AM CST -----  Contact: Pt   Name of Who is Calling: JOHN BENSON [[id number]]      What is the request in detail: Patient states she is returning a call from staff to schedule an injection appt. Please contact to further discuss and advise      Can the clinic reply by [facility]: No       What Number to Call Back if not in [facility]: [phone]

## 2018-11-13 ENCOUNTER — CLINICAL SUPPORT (OUTPATIENT)
Dept: OTOLARYNGOLOGY | Facility: CLINIC | Age: 58
End: 2018-11-13
Payer: MEDICARE

## 2018-11-13 DIAGNOSIS — J30.9 ALLERGIC RHINITIS, UNSPECIFIED SEASONALITY, UNSPECIFIED TRIGGER: Primary | ICD-10-CM

## 2018-11-13 PROCEDURE — 95165 ANTIGEN THERAPY SERVICES: CPT | Mod: S$GLB,,, | Performed by: SPECIALIST

## 2018-11-13 PROCEDURE — 95117 IMMUNOTHERAPY INJECTIONS: CPT | Mod: S$GLB,,, | Performed by: SPECIALIST

## 2018-11-13 NOTE — PROGRESS NOTES
Patient presented to clinic for 1st allergy shot administration. Patient's identity was verified with 2 patient identifiers with full name and date of birth.  Patient last office visit: 10/29/2018     History of Asthma no.     Review of patient's allergies indicates:  Review of patient's allergies indicates:  No Known Allergies        Patient last menstrual period: Hysterectomy     Patient denies experiencing asthma symptoms of any degree.     Patient denies feeling ill enough to miss school / work     Patient denies  Having a rash or hives.     Educated pt on immunotherapy injections. Handout provided. Patient instructed to stay in the clinic for 30 minutes after the injection. Patient instructed to not exercise or play sports one hour before or after receiving an allergy injection. Patient instructed to not get another injection or immunization today. Green-top allergy vials verified by patient. Injection x 2 given, pt tolerated well. No adverse reactions observed.

## 2018-11-16 ENCOUNTER — CLINICAL SUPPORT (OUTPATIENT)
Dept: OTOLARYNGOLOGY | Facility: CLINIC | Age: 58
End: 2018-11-16
Payer: MEDICARE

## 2018-11-16 DIAGNOSIS — J30.9 ALLERGIC RHINITIS, UNSPECIFIED SEASONALITY, UNSPECIFIED TRIGGER: Primary | ICD-10-CM

## 2018-11-16 PROCEDURE — 95117 IMMUNOTHERAPY INJECTIONS: CPT | Mod: S$GLB,,, | Performed by: SPECIALIST

## 2018-11-18 NOTE — PROGRESS NOTES
Denies reaction from last allergy injection. Denies feeling sick. Green top [id number] allergy vials verified by patient. Injection x2 given, tolerated well. Advised to wait 30 min after injection to monitor for adverse reactions.

## 2018-11-20 ENCOUNTER — CLINICAL SUPPORT (OUTPATIENT)
Dept: OTOLARYNGOLOGY | Facility: CLINIC | Age: 58
End: 2018-11-20
Payer: MEDICARE

## 2018-11-20 DIAGNOSIS — J30.9 ALLERGIC RHINITIS, UNSPECIFIED SEASONALITY, UNSPECIFIED TRIGGER: Primary | ICD-10-CM

## 2018-11-20 PROCEDURE — 95117 IMMUNOTHERAPY INJECTIONS: CPT | Mod: S$GLB,,, | Performed by: SPECIALIST

## 2018-11-20 NOTE — PROGRESS NOTES
Denies reaction from last allergy injection. Denies feeling sick. Green top 1:[id number] allergy vials verified by patient. Injection x2 given, tolerated well. Advised to wait 30 min after injection to monitor for adverse reactions.

## 2018-11-27 ENCOUNTER — CLINICAL SUPPORT (OUTPATIENT)
Dept: OTOLARYNGOLOGY | Facility: CLINIC | Age: 58
End: 2018-11-27
Payer: MEDICARE

## 2018-11-27 ENCOUNTER — OFFICE VISIT (OUTPATIENT)
Dept: OTOLARYNGOLOGY | Facility: CLINIC | Age: 58
End: 2018-11-27
Payer: MEDICARE

## 2018-11-27 VITALS
SYSTOLIC BLOOD PRESSURE: 122 MMHG | TEMPERATURE: 97 F | DIASTOLIC BLOOD PRESSURE: 76 MMHG | BODY MASS INDEX: 25.79 KG/M2 | HEART RATE: 70 BPM | HEIGHT: 66 IN | WEIGHT: 160.5 LBS

## 2018-11-27 DIAGNOSIS — R60.1 GENERALIZED EDEMA: ICD-10-CM

## 2018-11-27 DIAGNOSIS — J30.9 ALLERGIC RHINITIS, UNSPECIFIED SEASONALITY, UNSPECIFIED TRIGGER: Primary | ICD-10-CM

## 2018-11-27 DIAGNOSIS — J34.2 NASAL SEPTAL DEVIATION: ICD-10-CM

## 2018-11-27 DIAGNOSIS — G50.0 IDIOPATHIC TRIGEMINAL NEURALGIA: ICD-10-CM

## 2018-11-27 DIAGNOSIS — J34.9 PARANASAL SINUS DISEASE: ICD-10-CM

## 2018-11-27 DIAGNOSIS — R51.9 NONINTRACTABLE EPISODIC HEADACHE, UNSPECIFIED HEADACHE TYPE: ICD-10-CM

## 2018-11-27 PROCEDURE — 3008F BODY MASS INDEX DOCD: CPT | Mod: CPTII,S$GLB,, | Performed by: SPECIALIST

## 2018-11-27 PROCEDURE — 95117 IMMUNOTHERAPY INJECTIONS: CPT | Mod: S$GLB,,, | Performed by: SPECIALIST

## 2018-11-27 PROCEDURE — 99214 OFFICE O/P EST MOD 30 MIN: CPT | Mod: 25,S$GLB,, | Performed by: SPECIALIST

## 2018-11-27 NOTE — PROGRESS NOTES
Denies reaction from last allergy injection. Denies feeling sick. Green top 1:[id number] allergy vials verified by patient. Injection x2 given, tolerated well. Advised to wait 30 min after injection to monitor for adverse reactions.

## 2018-11-28 NOTE — PROGRESS NOTES
Subjective:       Patient ID: Nena Craig is a 58 y.o. female.    Chief Complaint: Follow-up    The patient is coming in for follow-up visit.  Overall, she feels like she has improved significantly since last visit.  One of her primary issues involved with allergic attacks are soft tissue swelling, particularly in the abdominal area.  This at times becomes severe enough that she wears a corset.  She continues using Flonase, Claritin and Singulair on a daily basis is now taking immunotherapy.  When she works outside in the [employer] she has begun wearing a respirator and has found it to be beneficial.  She has not tested all foods to which she tested positive.  Thus far she has not found any strong reaction to any food tested.      Review of Systems   Constitutional: Positive for fatigue. Negative for activity change, appetite change, chills, fever and unexpected weight change.   HENT: Positive for congestion, postnasal drip, rhinorrhea, sinus pressure, sinus pain and sore throat. Negative for ear discharge, ear pain, facial swelling, hearing loss, mouth sores, sneezing, tinnitus, trouble swallowing and voice change.    Eyes: Negative for photophobia, pain, discharge, redness, itching and visual disturbance.   Respiratory: Positive for cough. Negative for apnea, choking, shortness of breath and wheezing.    Cardiovascular: Negative for chest pain and palpitations.   Gastrointestinal: Positive for abdominal distention and abdominal pain. Negative for nausea and vomiting.   Musculoskeletal: Negative for arthralgias, myalgias, neck pain and neck stiffness.   Skin: Negative.  Negative for color change, pallor and rash.   Allergic/Immunologic: Positive for environmental allergies and food allergies. Negative for immunocompromised state.   Neurological: Positive for headaches. Negative for dizziness, facial asymmetry, speech difficulty, weakness, light-headedness and numbness.        Left tic douloureux   Hematological:  Positive for adenopathy. Does not bruise/bleed easily.   Psychiatric/Behavioral: Negative for confusion, decreased concentration and sleep disturbance.       Objective:      Physical Exam   Constitutional: She is oriented to person, place, and time. She appears well-developed and well-nourished. She is cooperative.   HENT:   Head: Normocephalic.   Right Ear: External ear and ear canal normal. Tympanic membrane is retracted.   Left Ear: External ear and ear canal normal. Tympanic membrane is retracted.   Nose: Mucosal edema (cyanotic, boggy inferior turbinates bilaterally), rhinorrhea (clear mucus bilaterally) and septal deviation (To the right) present.   Mouth/Throat: Uvula is midline, oropharynx is clear and moist and mucous membranes are normal. No oral lesions.   Eyes: EOM and lids are normal. Pupils are equal, round, and reactive to light. Right eye exhibits no discharge and no exudate. Left eye exhibits no discharge and no exudate. Right conjunctiva is injected. Left conjunctiva is injected.   Neck: Trachea normal and normal range of motion. No muscular tenderness present. No tracheal deviation present. No thyroid mass and no thyromegaly present.   Cardiovascular: Normal rate, regular rhythm, normal heart sounds and normal pulses.   Pulmonary/Chest: Effort normal and breath sounds normal. No stridor. She has no decreased breath sounds. She has no wheezes. She has no rhonchi. She has no rales.   Abdominal: Soft. Bowel sounds are normal. There is no tenderness.   Musculoskeletal: Normal range of motion.   Lymphadenopathy:        Head (right side): No submental, no submandibular, no preauricular, no posterior auricular and no occipital adenopathy present.        Head (left side): No submental, no submandibular, no preauricular, no posterior auricular and no occipital adenopathy present.     She has no cervical adenopathy.   Neurological: She is alert and oriented to person, place, and time. She has normal  strength. No cranial nerve deficit or sensory deficit. Gait normal.   Skin: Skin is warm and dry. No petechiae and no rash noted. No cyanosis. Nails show no clubbing.   Psychiatric: She has a normal mood and affect. Her speech is normal and behavior is normal. Judgment and thought content normal. Cognition and memory are normal.       Assessment:       1. Allergic rhinitis, unspecified seasonality, unspecified trigger    2. Nasal septal deviation    3. Generalized edema    4. Idiopathic trigeminal neuralgia    5. Nonintractable episodic headache, unspecified headache type    6. Paranasal sinus disease        Plan:       I will have the patient continue with her food challenge test.  She is to continue daily use of the 3 medications listed above and immunotherapy injections.  I will recheck her in 2 months, or sooner on an as-needed basis.

## 2018-11-30 ENCOUNTER — CLINICAL SUPPORT (OUTPATIENT)
Dept: OTOLARYNGOLOGY | Facility: CLINIC | Age: 58
End: 2018-11-30
Payer: MEDICARE

## 2018-11-30 DIAGNOSIS — J30.1 ALLERGIC RHINITIS DUE TO POLLEN, UNSPECIFIED SEASONALITY: Primary | ICD-10-CM

## 2018-11-30 PROCEDURE — 95117 IMMUNOTHERAPY INJECTIONS: CPT | Mod: S$GLB,,, | Performed by: SPECIALIST

## 2018-11-30 NOTE — PROGRESS NOTES
Denies reaction from last allergy injection. Denies feeling sick. Green top [id number] allergy vials verified by patient. Injection x2 given, tolerated well. Advised to wait 30 min after injection to monitor for adverse reactions.

## 2018-12-03 ENCOUNTER — IMMUNIZATION (OUTPATIENT)
Dept: PHARMACY | Facility: CLINIC | Age: 58
End: 2018-12-03
Payer: MEDICARE

## 2018-12-07 ENCOUNTER — CLINICAL SUPPORT (OUTPATIENT)
Dept: OTOLARYNGOLOGY | Facility: CLINIC | Age: 58
End: 2018-12-07
Payer: MEDICARE

## 2018-12-07 DIAGNOSIS — J30.1 ALLERGIC RHINITIS DUE TO POLLEN, UNSPECIFIED SEASONALITY: Primary | ICD-10-CM

## 2018-12-07 PROCEDURE — 95117 IMMUNOTHERAPY INJECTIONS: CPT | Mod: S$GLB,,, | Performed by: SPECIALIST

## 2018-12-07 NOTE — PROGRESS NOTES
Denies reaction from last allergy injection. Denies feeling sick. Green top 1:10,000 allergy vials verified by patient. Injection x2 given, tolerated well. Advised to wait 30 min after injection to monitor for adverse reactions.

## 2018-12-11 ENCOUNTER — CLINICAL SUPPORT (OUTPATIENT)
Dept: OTOLARYNGOLOGY | Facility: CLINIC | Age: 58
End: 2018-12-11
Payer: MEDICARE

## 2018-12-11 DIAGNOSIS — J30.9 ALLERGIC RHINITIS, UNSPECIFIED SEASONALITY, UNSPECIFIED TRIGGER: Primary | ICD-10-CM

## 2018-12-11 PROCEDURE — 95117 IMMUNOTHERAPY INJECTIONS: CPT | Mod: S$GLB,,, | Performed by: SPECIALIST

## 2018-12-13 ENCOUNTER — TELEPHONE (OUTPATIENT)
Dept: OTOLARYNGOLOGY | Facility: CLINIC | Age: 58
End: 2018-12-13

## 2018-12-14 ENCOUNTER — CLINICAL SUPPORT (OUTPATIENT)
Dept: OTOLARYNGOLOGY | Facility: CLINIC | Age: 58
End: 2018-12-14
Payer: MEDICARE

## 2018-12-14 DIAGNOSIS — J30.9 ALLERGIC RHINITIS, UNSPECIFIED SEASONALITY, UNSPECIFIED TRIGGER: Primary | ICD-10-CM

## 2018-12-14 PROCEDURE — 95117 IMMUNOTHERAPY INJECTIONS: CPT | Mod: S$GLB,,, | Performed by: SPECIALIST

## 2018-12-14 NOTE — PROGRESS NOTES
Denies reaction from last allergy injection. Denies feeling sick. Yellow top 1:94509 allergy vials verified by patient. Injection x2 given, tolerated well. Advised to wait 30 min after injection to monitor for adverse reactions.

## 2018-12-21 ENCOUNTER — CLINICAL SUPPORT (OUTPATIENT)
Dept: OTOLARYNGOLOGY | Facility: CLINIC | Age: 58
End: 2018-12-21
Payer: MEDICARE

## 2018-12-21 DIAGNOSIS — J30.9 ALLERGIC RHINITIS, UNSPECIFIED SEASONALITY, UNSPECIFIED TRIGGER: Primary | ICD-10-CM

## 2018-12-21 PROCEDURE — 95117 IMMUNOTHERAPY INJECTIONS: CPT | Mod: S$GLB,,, | Performed by: SPECIALIST

## 2018-12-21 NOTE — PROGRESS NOTES
Denies reaction from last allergy injection. Denies feeling sick. Yellow top 1:64045 allergy vials verified by patient. Injection x2 given, tolerated well. Advised to wait 30 min after injection to monitor for adverse reactions.

## 2018-12-28 ENCOUNTER — CLINICAL SUPPORT (OUTPATIENT)
Dept: OTOLARYNGOLOGY | Facility: CLINIC | Age: 58
End: 2018-12-28
Payer: MEDICARE

## 2018-12-28 DIAGNOSIS — J30.9 ALLERGIC RHINITIS, UNSPECIFIED SEASONALITY, UNSPECIFIED TRIGGER: Primary | ICD-10-CM

## 2018-12-28 PROCEDURE — 95117 IMMUNOTHERAPY INJECTIONS: CPT | Mod: S$GLB,,, | Performed by: SPECIALIST

## 2018-12-28 NOTE — PROGRESS NOTES
Denies reaction from last allergy injection. Denies feeling sick. Yellow top 1:1000 allergy vials verified by patient. Injection x2 given, tolerated well. Advised to wait 30 min after injection to monitor for adverse reactions.

## 2019-01-03 ENCOUNTER — CLINICAL SUPPORT (OUTPATIENT)
Dept: OTOLARYNGOLOGY | Facility: CLINIC | Age: 59
End: 2019-01-03
Payer: MEDICARE

## 2019-01-03 DIAGNOSIS — J30.9 ALLERGIC RHINITIS, UNSPECIFIED SEASONALITY, UNSPECIFIED TRIGGER: Primary | ICD-10-CM

## 2019-01-03 PROCEDURE — 95117 PR IMMU2THERAPY, 2+ INJECTIONS: ICD-10-PCS | Mod: S$GLB,,, | Performed by: SPECIALIST

## 2019-01-03 PROCEDURE — 95165 PR PROFES SVC,IMMUNOTHER,SINGLE/MULT AGS: ICD-10-PCS | Mod: S$GLB,,, | Performed by: SPECIALIST

## 2019-01-03 PROCEDURE — 95165 ANTIGEN THERAPY SERVICES: CPT | Mod: S$GLB,,, | Performed by: SPECIALIST

## 2019-01-03 PROCEDURE — 95117 IMMUNOTHERAPY INJECTIONS: CPT | Mod: S$GLB,,, | Performed by: SPECIALIST

## 2019-01-03 NOTE — PROGRESS NOTES
Denies reaction from last allergy injection. Denies feeling sick. Blue top 1:1000 allergy vials verified by patient. Injection x2 given, tolerated well. Advised to wait 30 min after injection to monitor for adverse reactions.

## 2019-01-08 ENCOUNTER — CLINICAL SUPPORT (OUTPATIENT)
Dept: OTOLARYNGOLOGY | Facility: CLINIC | Age: 59
End: 2019-01-08
Payer: MEDICARE

## 2019-01-08 DIAGNOSIS — J30.9 ALLERGIC RHINITIS, UNSPECIFIED SEASONALITY, UNSPECIFIED TRIGGER: Primary | ICD-10-CM

## 2019-01-08 PROCEDURE — 95117 IMMUNOTHERAPY INJECTIONS: CPT | Mod: S$GLB,,, | Performed by: SPECIALIST

## 2019-01-08 PROCEDURE — 95117 PR IMMU2THERAPY, 2+ INJECTIONS: ICD-10-PCS | Mod: S$GLB,,, | Performed by: SPECIALIST

## 2019-01-08 NOTE — PROGRESS NOTES
Denies reaction from last allergy injection. Denies feeling sick. Blue top 1:1000 allergy vial verified by patient. Injection x2 given, tolerated well. Advised to wait 30 min after injection to monitor for adverse reactions.

## 2019-01-10 ENCOUNTER — CLINICAL SUPPORT (OUTPATIENT)
Dept: OTOLARYNGOLOGY | Facility: CLINIC | Age: 59
End: 2019-01-10
Payer: MEDICARE

## 2019-01-10 DIAGNOSIS — J30.9 ALLERGIC RHINITIS, UNSPECIFIED SEASONALITY, UNSPECIFIED TRIGGER: Primary | ICD-10-CM

## 2019-01-10 PROCEDURE — 95117 IMMUNOTHERAPY INJECTIONS: CPT | Mod: S$GLB,,, | Performed by: SPECIALIST

## 2019-01-10 PROCEDURE — 95117 PR IMMU2THERAPY, 2+ INJECTIONS: ICD-10-PCS | Mod: S$GLB,,, | Performed by: SPECIALIST

## 2019-01-17 ENCOUNTER — CLINICAL SUPPORT (OUTPATIENT)
Dept: OTOLARYNGOLOGY | Facility: CLINIC | Age: 59
End: 2019-01-17
Payer: MEDICARE

## 2019-01-17 DIAGNOSIS — J30.9 ALLERGIC RHINITIS, UNSPECIFIED SEASONALITY, UNSPECIFIED TRIGGER: Primary | ICD-10-CM

## 2019-01-17 PROCEDURE — 95117 IMMUNOTHERAPY INJECTIONS: CPT | Mod: S$GLB,,, | Performed by: SPECIALIST

## 2019-01-17 PROCEDURE — 95117 PR IMMU2THERAPY, 2+ INJECTIONS: ICD-10-PCS | Mod: S$GLB,,, | Performed by: SPECIALIST

## 2019-01-22 ENCOUNTER — CLINICAL SUPPORT (OUTPATIENT)
Dept: OTOLARYNGOLOGY | Facility: CLINIC | Age: 59
End: 2019-01-22
Payer: MEDICARE

## 2019-01-22 DIAGNOSIS — J30.9 ALLERGIC RHINITIS, UNSPECIFIED SEASONALITY, UNSPECIFIED TRIGGER: Primary | ICD-10-CM

## 2019-01-22 PROCEDURE — 95117 PR IMMU2THERAPY, 2+ INJECTIONS: ICD-10-PCS | Mod: S$GLB,,, | Performed by: SPECIALIST

## 2019-01-22 PROCEDURE — 95117 IMMUNOTHERAPY INJECTIONS: CPT | Mod: S$GLB,,, | Performed by: SPECIALIST

## 2019-01-23 DIAGNOSIS — E03.4 HYPOTHYROIDISM DUE TO ACQUIRED ATROPHY OF THYROID: ICD-10-CM

## 2019-01-23 RX ORDER — LEVOTHYROXINE SODIUM 88 UG/1
TABLET ORAL
Qty: 90 TABLET | Refills: 1 | Status: SHIPPED | OUTPATIENT
Start: 2019-01-23 | End: 2019-06-28 | Stop reason: SDUPTHER

## 2019-01-24 ENCOUNTER — CLINICAL SUPPORT (OUTPATIENT)
Dept: OTOLARYNGOLOGY | Facility: CLINIC | Age: 59
End: 2019-01-24
Payer: MEDICARE

## 2019-01-24 DIAGNOSIS — J30.9 ALLERGIC RHINITIS, UNSPECIFIED SEASONALITY, UNSPECIFIED TRIGGER: Primary | ICD-10-CM

## 2019-01-24 NOTE — PROGRESS NOTES
Denies reaction from last allergy injection. Denies feeling sick. Started new allergy vials. Red top 1:100 allergy vials verified by patient. Injection x2 given, tolerated well. Advised to wait 30 min after injection to monitor for adverse reactions.

## 2019-01-29 ENCOUNTER — CLINICAL SUPPORT (OUTPATIENT)
Dept: OTOLARYNGOLOGY | Facility: CLINIC | Age: 59
End: 2019-01-29
Payer: MEDICARE

## 2019-01-29 DIAGNOSIS — J30.9 ALLERGIC RHINITIS, UNSPECIFIED SEASONALITY, UNSPECIFIED TRIGGER: Primary | ICD-10-CM

## 2019-01-29 PROCEDURE — 95117 IMMUNOTHERAPY INJECTIONS: CPT | Mod: S$GLB,,, | Performed by: SPECIALIST

## 2019-01-29 PROCEDURE — 95117 PR IMMU2THERAPY, 2+ INJECTIONS: ICD-10-PCS | Mod: S$GLB,,, | Performed by: SPECIALIST

## 2019-01-29 NOTE — PROGRESS NOTES
Denies reaction from last allergy injection. Denies feeling sick. Red top 1:100 allergy vials verified by patient. Injection x2 given, tolerated well.

## 2019-01-30 ENCOUNTER — TELEPHONE (OUTPATIENT)
Dept: OTOLARYNGOLOGY | Facility: CLINIC | Age: 59
End: 2019-01-30

## 2019-01-30 NOTE — TELEPHONE ENCOUNTER
----- Message from Damaso Wheeler sent at 1/30/2019  4:51 PM CST -----  Contact: Sofia ([facility])  Name of Who is Calling: Sofia ([facility])      What is the request in detail: Requesting a prescription for ipratropium bromide 0.03 nasal spray be sent to [facility]. Fax [phone]      Can the clinic reply by [facility]: no      What Number to Call Back if not in [facility]: [phone]

## 2019-01-30 NOTE — TELEPHONE ENCOUNTER
Called patient today letting her know she need to call back with a pharmacy where she need this Rx sent .

## 2019-01-31 ENCOUNTER — CLINICAL SUPPORT (OUTPATIENT)
Dept: OTOLARYNGOLOGY | Facility: CLINIC | Age: 59
End: 2019-01-31
Payer: MEDICARE

## 2019-01-31 DIAGNOSIS — J30.9 ALLERGIC RHINITIS, UNSPECIFIED SEASONALITY, UNSPECIFIED TRIGGER: Primary | ICD-10-CM

## 2019-01-31 PROCEDURE — 95117 IMMUNOTHERAPY INJECTIONS: CPT | Mod: S$GLB,,, | Performed by: SPECIALIST

## 2019-01-31 PROCEDURE — 95117 PR IMMU2THERAPY, 2+ INJECTIONS: ICD-10-PCS | Mod: S$GLB,,, | Performed by: SPECIALIST

## 2019-01-31 RX ORDER — IPRATROPIUM BROMIDE 21 UG/1
2 SPRAY, METERED NASAL 2 TIMES DAILY
Qty: 30 ML | Refills: 11 | Status: SHIPPED | OUTPATIENT
Start: 2019-01-31 | End: 2019-02-05 | Stop reason: SDUPTHER

## 2019-01-31 RX ORDER — CLOTRIMAZOLE 10 MG/1
LOZENGE ORAL; TOPICAL
Refills: 1 | COMMUNITY
Start: 2019-01-21 | End: 2019-11-19

## 2019-01-31 NOTE — PROGRESS NOTES
Denies reaction from last allergy injection. Denies feeling sick. Red top 1:100 allergy vials verified by patient. Injection x2 given, tolerated well. Advised to wait 30 min after injection to monitor for adverse reactions.

## 2019-02-05 ENCOUNTER — CLINICAL SUPPORT (OUTPATIENT)
Dept: OTOLARYNGOLOGY | Facility: CLINIC | Age: 59
End: 2019-02-05
Payer: MEDICARE

## 2019-02-05 DIAGNOSIS — J30.9 ALLERGIC RHINITIS, UNSPECIFIED SEASONALITY, UNSPECIFIED TRIGGER: Primary | ICD-10-CM

## 2019-02-05 PROCEDURE — 95117 IMMUNOTHERAPY INJECTIONS: CPT | Mod: S$GLB,,, | Performed by: SPECIALIST

## 2019-02-05 PROCEDURE — 95117 PR IMMU2THERAPY, 2+ INJECTIONS: ICD-10-PCS | Mod: S$GLB,,, | Performed by: SPECIALIST

## 2019-02-05 RX ORDER — IPRATROPIUM BROMIDE 21 UG/1
2 SPRAY, METERED NASAL 2 TIMES DAILY
Qty: 30 ML | Refills: 11 | Status: SHIPPED | OUTPATIENT
Start: 2019-02-05 | End: 2022-04-21 | Stop reason: SDUPTHER

## 2019-02-07 ENCOUNTER — CLINICAL SUPPORT (OUTPATIENT)
Dept: OTOLARYNGOLOGY | Facility: CLINIC | Age: 59
End: 2019-02-07
Payer: MEDICARE

## 2019-02-07 DIAGNOSIS — J30.9 ALLERGIC RHINITIS, UNSPECIFIED SEASONALITY, UNSPECIFIED TRIGGER: Primary | ICD-10-CM

## 2019-02-07 PROCEDURE — 95117 IMMUNOTHERAPY INJECTIONS: CPT | Mod: S$GLB,,, | Performed by: SPECIALIST

## 2019-02-07 PROCEDURE — 95117 PR IMMU2THERAPY, 2+ INJECTIONS: ICD-10-PCS | Mod: S$GLB,,, | Performed by: SPECIALIST

## 2019-02-12 ENCOUNTER — CLINICAL SUPPORT (OUTPATIENT)
Dept: OTOLARYNGOLOGY | Facility: CLINIC | Age: 59
End: 2019-02-12
Payer: MEDICARE

## 2019-02-12 DIAGNOSIS — J30.9 ALLERGIC RHINITIS, UNSPECIFIED SEASONALITY, UNSPECIFIED TRIGGER: Primary | ICD-10-CM

## 2019-02-12 PROCEDURE — 95117 IMMUNOTHERAPY INJECTIONS: CPT | Mod: S$GLB,,, | Performed by: SPECIALIST

## 2019-02-12 PROCEDURE — 95117 PR IMMU2THERAPY, 2+ INJECTIONS: ICD-10-PCS | Mod: S$GLB,,, | Performed by: SPECIALIST

## 2019-02-12 NOTE — PROGRESS NOTES
Pt reported no reaction to last injection. Pt stared and verified new vial, silver top 1:20. Injections given x's 2. Tolerated well. Pt advised to wait 30 minutes in lobby to monitor for adverse reactions. Pt verbalized understanding.

## 2019-02-22 ENCOUNTER — CLINICAL SUPPORT (OUTPATIENT)
Dept: OTOLARYNGOLOGY | Facility: CLINIC | Age: 59
End: 2019-02-22
Payer: MEDICARE

## 2019-02-22 DIAGNOSIS — J30.9 ALLERGIC RHINITIS, UNSPECIFIED SEASONALITY, UNSPECIFIED TRIGGER: Primary | ICD-10-CM

## 2019-02-22 NOTE — PROGRESS NOTES
Pt reports burning at the time of injection that lasted a few seconds with last allergy injection. She verified silver top allergy vials, 1:20. Injections given x's two. Tolerated well.

## 2019-02-26 ENCOUNTER — TELEPHONE (OUTPATIENT)
Dept: OTOLARYNGOLOGY | Facility: CLINIC | Age: 59
End: 2019-02-26

## 2019-03-12 ENCOUNTER — OFFICE VISIT (OUTPATIENT)
Dept: OTOLARYNGOLOGY | Facility: CLINIC | Age: 59
End: 2019-03-12
Payer: MEDICARE

## 2019-03-12 DIAGNOSIS — R51.9 NONINTRACTABLE EPISODIC HEADACHE, UNSPECIFIED HEADACHE TYPE: ICD-10-CM

## 2019-03-12 DIAGNOSIS — H92.02 LEFT EAR PAIN: ICD-10-CM

## 2019-03-12 DIAGNOSIS — J34.2 NASAL SEPTAL DEVIATION: ICD-10-CM

## 2019-03-12 DIAGNOSIS — G50.0 IDIOPATHIC TRIGEMINAL NEURALGIA: ICD-10-CM

## 2019-03-12 DIAGNOSIS — B37.0 ORAL CANDIDIASIS: Primary | ICD-10-CM

## 2019-03-12 DIAGNOSIS — J30.1 ALLERGIC RHINITIS DUE TO POLLEN, UNSPECIFIED SEASONALITY: ICD-10-CM

## 2019-03-12 PROCEDURE — 96372 THER/PROPH/DIAG INJ SC/IM: CPT | Mod: S$GLB,,, | Performed by: SPECIALIST

## 2019-03-12 PROCEDURE — 99214 OFFICE O/P EST MOD 30 MIN: CPT | Mod: 25,S$GLB,, | Performed by: SPECIALIST

## 2019-03-12 PROCEDURE — 99214 PR OFFICE/OUTPT VISIT, EST, LEVL IV, 30-39 MIN: ICD-10-PCS | Mod: 25,S$GLB,, | Performed by: SPECIALIST

## 2019-03-12 PROCEDURE — 96372 PR INJECTION,THERAP/PROPH/DIAG2ST, IM OR SUBCUT: ICD-10-PCS | Mod: S$GLB,,, | Performed by: SPECIALIST

## 2019-03-12 RX ORDER — TRIAMCINOLONE ACETONIDE 40 MG/ML
40 INJECTION, SUSPENSION INTRA-ARTICULAR; INTRAMUSCULAR ONCE
Status: COMPLETED | OUTPATIENT
Start: 2019-03-12 | End: 2019-03-12

## 2019-03-12 RX ORDER — CYANOCOBALAMIN 1000 UG/ML
1000 INJECTION, SOLUTION INTRAMUSCULAR; SUBCUTANEOUS ONCE
Status: COMPLETED | OUTPATIENT
Start: 2019-03-12 | End: 2019-03-12

## 2019-03-12 RX ORDER — FLUCONAZOLE 100 MG/1
TABLET ORAL
Qty: 31 TABLET | Refills: 3 | Status: SHIPPED | OUTPATIENT
Start: 2019-03-12 | End: 2019-11-19

## 2019-03-12 RX ADMIN — CYANOCOBALAMIN 1000 MCG: 1000 INJECTION, SOLUTION INTRAMUSCULAR; SUBCUTANEOUS at 03:03

## 2019-03-12 RX ADMIN — TRIAMCINOLONE ACETONIDE 40 MG: 40 INJECTION, SUSPENSION INTRA-ARTICULAR; INTRAMUSCULAR at 03:03

## 2019-03-12 NOTE — PROGRESS NOTES
Subjective:       Patient ID: Nena Craig is a 59 y.o. female.    Chief Complaint: Sinus Problem and Follow-up    The patient is coming in for follow-up visit.  There are multiple issues to discuss:  1.  With regard to her allergy/immunotherapy injections she feels like she is having significant benefit.  She is now able to go outside in do things that she would not tolerate before.  She continues to have congestion, headache, postnasal drip ends swelling of her ft and legs.  All symptoms are improved.  She continues to take Claritin, Flonase, Astelin and Singulair on a daily basis  2.  She is having recurring swelling of her feet with associated pain.  Typically the right side bothersome more than the left.  Symptoms very in frequency and intensity.  3.  Her mouth is very dry.  She does have some burning on her tongue. She feels like she is continuing with issues with yeast in her oral cavity. She has been using Mycelex troches.  4.  Her left trigeminal neuralgia has improved.  She continues to have flares of symptoms when weather fronts come through.  She is having a fair attic episodes of left ear pain when her trigeminal neuralgia flares up.      Review of Systems   Constitutional: Positive for fatigue. Negative for activity change, appetite change, chills, fever and unexpected weight change.   HENT: Positive for congestion, ear pain, mouth sores, postnasal drip, rhinorrhea, sinus pressure, sinus pain and sore throat. Negative for ear discharge, facial swelling, hearing loss, sneezing, tinnitus, trouble swallowing and voice change.         Left otalgia   Eyes: Positive for pain (On the left). Negative for photophobia, discharge, redness, itching and visual disturbance.   Respiratory: Positive for cough. Negative for apnea, choking, shortness of breath and wheezing.    Cardiovascular: Negative for chest pain and palpitations.   Gastrointestinal: Negative for abdominal distention, abdominal pain, nausea and  vomiting.   Musculoskeletal: Negative for arthralgias, myalgias, neck pain and neck stiffness.        Swelling of the feet and lower legs  Recurring pain of the feet   Skin: Negative.  Negative for color change, pallor and rash.   Allergic/Immunologic: Positive for environmental allergies. Negative for food allergies and immunocompromised state.   Neurological: Positive for headaches. Negative for dizziness, facial asymmetry, speech difficulty, weakness, light-headedness and numbness.   Hematological: Negative for adenopathy. Does not bruise/bleed easily.   Psychiatric/Behavioral: Negative for confusion, decreased concentration and sleep disturbance.       Objective:      Physical Exam   Constitutional: She is oriented to person, place, and time. She appears well-developed and well-nourished. She is cooperative.   HENT:   Head: Normocephalic.   Right Ear: External ear and ear canal normal. Tympanic membrane is retracted.   Left Ear: External ear and ear canal normal. Tympanic membrane is retracted.   Nose: Mucosal edema (cyanotic, boggy inferior turbinates bilaterally), rhinorrhea (clear mucus bilaterally) and septal deviation present. Nasal deformity: To the right.   Mouth/Throat: Uvula is midline, oropharynx is clear and moist and mucous membranes are normal. No oral lesions.   Eyes: EOM and lids are normal. Pupils are equal, round, and reactive to light. Right eye exhibits no discharge and no exudate. Left eye exhibits no discharge and no exudate. Right conjunctiva is injected. Left conjunctiva is injected.   Neck: Trachea normal and normal range of motion. No muscular tenderness present. No tracheal deviation present. No thyroid mass and no thyromegaly present.   Cardiovascular: Normal rate, regular rhythm, normal heart sounds and normal pulses.   Pulmonary/Chest: Effort normal and breath sounds normal. No stridor. She has no decreased breath sounds. She has no wheezes. She has no rhonchi. She has no rales.    Abdominal: Soft. Bowel sounds are normal. There is no tenderness.   Musculoskeletal: Normal range of motion.   Lymphadenopathy:        Head (right side): No submental, no submandibular, no preauricular, no posterior auricular and no occipital adenopathy present.        Head (left side): No submental, no submandibular, no preauricular, no posterior auricular and no occipital adenopathy present.     She has no cervical adenopathy.   Neurological: She is alert and oriented to person, place, and time. She has normal strength. No cranial nerve deficit or sensory deficit. Gait normal.   Skin: Skin is warm and dry. No petechiae and no rash noted. No cyanosis. Nails show no clubbing.   Psychiatric: She has a normal mood and affect. Her speech is normal and behavior is normal. Judgment and thought content normal. Cognition and memory are normal.       Assessment:       1. Oral candidiasis    2. Allergic rhinitis due to pollen, unspecified seasonality    3. Nasal septal deviation    4. Nonintractable episodic headache, unspecified headache type    5. Idiopathic trigeminal neuralgia    6. Left ear pain        Plan:       I will give the patient an injection of steroids and B12 to help temporarily with the allergic component.  I am placing on Diflucan in addition to the Mycelex troches and I will recheck her in 4 weeks.  She will return earlier on an as-needed basis.

## 2019-04-09 ENCOUNTER — OFFICE VISIT (OUTPATIENT)
Dept: OTOLARYNGOLOGY | Facility: CLINIC | Age: 59
End: 2019-04-09
Payer: MEDICARE

## 2019-04-09 ENCOUNTER — CLINICAL SUPPORT (OUTPATIENT)
Dept: OTOLARYNGOLOGY | Facility: CLINIC | Age: 59
End: 2019-04-09
Payer: MEDICARE

## 2019-04-09 VITALS
SYSTOLIC BLOOD PRESSURE: 127 MMHG | HEART RATE: 82 BPM | HEIGHT: 66 IN | DIASTOLIC BLOOD PRESSURE: 80 MMHG | TEMPERATURE: 97 F | BODY MASS INDEX: 25.71 KG/M2 | WEIGHT: 160 LBS

## 2019-04-09 DIAGNOSIS — J34.2 NASAL SEPTAL DEVIATION: ICD-10-CM

## 2019-04-09 DIAGNOSIS — R51.9 NONINTRACTABLE EPISODIC HEADACHE, UNSPECIFIED HEADACHE TYPE: ICD-10-CM

## 2019-04-09 DIAGNOSIS — J30.9 ALLERGIC RHINITIS, UNSPECIFIED SEASONALITY, UNSPECIFIED TRIGGER: Primary | ICD-10-CM

## 2019-04-09 DIAGNOSIS — G50.0 IDIOPATHIC TRIGEMINAL NEURALGIA: ICD-10-CM

## 2019-04-09 DIAGNOSIS — B37.0 ORAL CANDIDIASIS: Primary | ICD-10-CM

## 2019-04-09 DIAGNOSIS — R60.1 GENERALIZED EDEMA: ICD-10-CM

## 2019-04-09 PROCEDURE — 3008F PR BODY MASS INDEX (BMI) DOCUMENTED: ICD-10-PCS | Mod: CPTII,S$GLB,, | Performed by: SPECIALIST

## 2019-04-09 PROCEDURE — 99214 PR OFFICE/OUTPT VISIT, EST, LEVL IV, 30-39 MIN: ICD-10-PCS | Mod: 25,S$GLB,, | Performed by: SPECIALIST

## 2019-04-09 PROCEDURE — 96372 THER/PROPH/DIAG INJ SC/IM: CPT | Mod: 59,S$GLB,, | Performed by: SPECIALIST

## 2019-04-09 PROCEDURE — 96372 PR INJECTION,THERAP/PROPH/DIAG2ST, IM OR SUBCUT: ICD-10-PCS | Mod: 59,S$GLB,, | Performed by: SPECIALIST

## 2019-04-09 PROCEDURE — 3008F BODY MASS INDEX DOCD: CPT | Mod: CPTII,S$GLB,, | Performed by: SPECIALIST

## 2019-04-09 PROCEDURE — 95117 IMMUNOTHERAPY INJECTIONS: CPT | Mod: S$GLB,,, | Performed by: SPECIALIST

## 2019-04-09 PROCEDURE — 95117 PR IMMU2THERAPY, 2+ INJECTIONS: ICD-10-PCS | Mod: S$GLB,,, | Performed by: SPECIALIST

## 2019-04-09 PROCEDURE — 99214 OFFICE O/P EST MOD 30 MIN: CPT | Mod: 25,S$GLB,, | Performed by: SPECIALIST

## 2019-04-09 RX ORDER — CYANOCOBALAMIN 1000 UG/ML
1000 INJECTION, SOLUTION INTRAMUSCULAR; SUBCUTANEOUS ONCE
Status: COMPLETED | OUTPATIENT
Start: 2019-04-09 | End: 2019-04-09

## 2019-04-09 RX ORDER — BETAMETHASONE SODIUM PHOSPHATE AND BETAMETHASONE ACETATE 3; 3 MG/ML; MG/ML
6 INJECTION, SUSPENSION INTRA-ARTICULAR; INTRALESIONAL; INTRAMUSCULAR; SOFT TISSUE ONCE
Status: COMPLETED | OUTPATIENT
Start: 2019-04-09 | End: 2019-04-09

## 2019-04-09 RX ORDER — NYSTATIN 100000 [USP'U]/ML
SUSPENSION ORAL
Qty: 360 ML | Refills: 5 | Status: SHIPPED | OUTPATIENT
Start: 2019-04-09 | End: 2019-11-19

## 2019-04-09 RX ADMIN — CYANOCOBALAMIN 1000 MCG: 1000 INJECTION, SOLUTION INTRAMUSCULAR; SUBCUTANEOUS at 03:04

## 2019-04-09 RX ADMIN — BETAMETHASONE SODIUM PHOSPHATE AND BETAMETHASONE ACETATE 6 MG: 3; 3 INJECTION, SUSPENSION INTRA-ARTICULAR; INTRALESIONAL; INTRAMUSCULAR; SOFT TISSUE at 03:04

## 2019-04-09 NOTE — PROGRESS NOTES
Subjective:       Patient ID: Nena Craig is a 59 y.o. female.    Chief Complaint: Mouth Slimmy    The patient has not taken allergy shots in about a month.  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  When she does so she wears closed and boats.  She still develops swelling of her extremities and abdominal bloating when she works outside.  Coincidentally, she has developed " thick slough I am in her mouth and a very bad taste.  Saliva is very thick.  She has been using Mycelex until 2 weeks ago.  It was not working.  I switched her to  Diflucan and it has improved.  Nasal secretions are clear.  She does not have fever.  She is having headaches.  She has general malaise and fatigue.    Review of Systems   Constitutional: Positive for fatigue. Negative for activity change, appetite change, chills, fever and unexpected weight change.   HENT: Positive for congestion, ear pain, mouth sores, postnasal drip, rhinorrhea, sinus pressure, sinus pain and sore throat. Negative for ear discharge, facial swelling, hearing loss, sneezing, tinnitus, trouble swallowing and voice change.    Eyes: Negative for photophobia, pain, discharge, redness, itching and visual disturbance.   Respiratory: Positive for cough. Negative for apnea, choking, shortness of breath and wheezing.    Cardiovascular: Negative for chest pain and palpitations.   Gastrointestinal: Positive for abdominal distention and abdominal pain. Negative for nausea and vomiting.   Musculoskeletal: Positive for back pain and neck pain. Negative for arthralgias, myalgias and neck stiffness.        Swelling of the extremities   Skin: Negative.  Negative for color change, pallor and rash.   Allergic/Immunologic: Positive for environmental allergies. Negative for food allergies and immunocompromised state.   Neurological: Positive for headaches. Negative for dizziness, facial asymmetry, speech difficulty, weakness, light-headedness and numbness.    Hematological: Negative for adenopathy. Does not bruise/bleed easily.   Psychiatric/Behavioral: Negative for confusion, decreased concentration and sleep disturbance.       Objective:      Physical Exam   Constitutional: She is oriented to person, place, and time. She appears well-developed and well-nourished. She is cooperative.   HENT:   Head: Normocephalic.   Right Ear: External ear and ear canal normal. Tympanic membrane is retracted.   Left Ear: External ear and ear canal normal. Tympanic membrane is retracted.   Nose: Mucosal edema (cyanotic, boggy inferior turbinates bilaterally), rhinorrhea (clear mucus bilaterally) and septal deviation (To the right) present.   Mouth/Throat: Uvula is midline, oropharynx is clear and moist and mucous membranes are normal. No oral lesions.   Eyes: Pupils are equal, round, and reactive to light. EOM and lids are normal. Right eye exhibits no discharge and no exudate. Left eye exhibits no discharge and no exudate. Right conjunctiva is injected. Left conjunctiva is injected.   Neck: Trachea normal and normal range of motion. No muscular tenderness present. No tracheal deviation present. No thyroid mass and no thyromegaly present.   Cardiovascular: Normal rate, regular rhythm, normal heart sounds and normal pulses.   Pulmonary/Chest: Effort normal. No stridor. She has decreased breath sounds. She has no wheezes. She has no rhonchi. She has no rales.   Abdominal: Soft. Bowel sounds are normal. There is no tenderness.   Musculoskeletal: Normal range of motion.   Lymphadenopathy:        Head (right side): No submental, no submandibular, no preauricular, no posterior auricular and no occipital adenopathy present.        Head (left side): No submental, no submandibular, no preauricular, no posterior auricular and no occipital adenopathy present.     She has no cervical adenopathy.   Neurological: She is alert and oriented to person, place, and time. She has normal strength. No  cranial nerve deficit or sensory deficit. Gait normal.   Skin: Skin is warm and dry. No petechiae and no rash noted. No cyanosis. Nails show no clubbing.   Psychiatric: She has a normal mood and affect. Her speech is normal and behavior is normal. Judgment and thought content normal. Cognition and memory are normal.       Assessment:       1. Oral candidiasis    2. Nonintractable episodic headache, unspecified headache type    3. Generalized edema    4. Nasal septal deviation    5. Idiopathic trigeminal neuralgia        Plan:       I will have the patient refill her Diflucan.  I am starting on nystatin to be used 4 times daily.  She swish in her mouth, gargle and swallow.  She will brush her tongue after using the nystatin.  I will recheck her in 2 weeks.  I am administering steroid and B12 injection to help overcome the acute flare of her allergies.

## 2019-04-09 NOTE — PROGRESS NOTES
Pt reports no reaction to last allergy injection. Pt denies feeling sick. She verified silver top allergy vials, 1:20. Injections given x2. Tolerated well.

## 2019-04-12 ENCOUNTER — CLINICAL SUPPORT (OUTPATIENT)
Dept: OTOLARYNGOLOGY | Facility: CLINIC | Age: 59
End: 2019-04-12
Payer: MEDICARE

## 2019-04-12 DIAGNOSIS — J30.9 ALLERGIC RHINITIS, UNSPECIFIED SEASONALITY, UNSPECIFIED TRIGGER: Primary | ICD-10-CM

## 2019-04-12 PROCEDURE — 95117 PR IMMU2THERAPY, 2+ INJECTIONS: ICD-10-PCS | Mod: S$GLB,,, | Performed by: SPECIALIST

## 2019-04-12 PROCEDURE — 95117 IMMUNOTHERAPY INJECTIONS: CPT | Mod: S$GLB,,, | Performed by: SPECIALIST

## 2019-04-29 ENCOUNTER — TELEPHONE (OUTPATIENT)
Dept: OTOLARYNGOLOGY | Facility: CLINIC | Age: 59
End: 2019-04-29

## 2019-04-29 NOTE — TELEPHONE ENCOUNTER
Returned pt call and scheduled an appt for 5/3/19.    ----- Message from Krystle Lemus sent at 4/29/2019  9:42 AM CDT -----  Name of Who is Calling: JOHN BARRY [[id number]]    What is the request in detail: Pt would like to schedule allergy shot for friday        Can the clinic reply by [facility]:   No       What Number to Call Back if not in [facility]: #[phone]

## 2019-05-03 ENCOUNTER — CLINICAL SUPPORT (OUTPATIENT)
Dept: OTOLARYNGOLOGY | Facility: CLINIC | Age: 59
End: 2019-05-03
Payer: MEDICARE

## 2019-05-03 DIAGNOSIS — J30.9 ALLERGIC RHINITIS, UNSPECIFIED SEASONALITY, UNSPECIFIED TRIGGER: Primary | ICD-10-CM

## 2019-05-03 PROCEDURE — 95117 IMMUNOTHERAPY INJECTIONS: CPT | Mod: S$GLB,,, | Performed by: SPECIALIST

## 2019-05-03 PROCEDURE — 95117 PR IMMU2THERAPY, 2+ INJECTIONS: ICD-10-PCS | Mod: S$GLB,,, | Performed by: SPECIALIST

## 2019-05-10 ENCOUNTER — TELEPHONE (OUTPATIENT)
Dept: OTOLARYNGOLOGY | Facility: CLINIC | Age: 59
End: 2019-05-10

## 2019-05-10 NOTE — TELEPHONE ENCOUNTER
Returned pt call LM on  to inform her that I received her message about cancelling today's appt and to call the clinic when she's ready to reschedule.        ----- Message from Paulina Mckenna sent at 5/10/2019 10:05 AM CDT -----  Contact: Pt   Name of Who is Calling: JOHN BENSON [[id number]]    What is the request in detail: Pt called to advise she cannot make this allergy shot appt today due to the weather. Please reschedule.     Can the clinic reply by [facility]: No     [facility]: [phone]

## 2019-06-24 ENCOUNTER — TELEPHONE (OUTPATIENT)
Dept: OTOLARYNGOLOGY | Facility: CLINIC | Age: 59
End: 2019-06-24

## 2019-06-24 NOTE — TELEPHONE ENCOUNTER
Returned pt call. LM on  with appt day and time.       ----- Message from Tabatha Gonzalez sent at 6/24/2019 11:41 AM CDT -----  Contact: Self            Name of Who is Calling: JOHN BENSON [[id number]]      What is the request in detail: Pt states she needs to schedule her allergy shot for this Friday 06/28 around 11 or 11:30 am. Please contact to further discuss and advise.        Can the clinic reply by [facility]: N      What Number to Call Back if not in [facility]: [phone]

## 2019-06-28 ENCOUNTER — CLINICAL SUPPORT (OUTPATIENT)
Dept: OTOLARYNGOLOGY | Facility: CLINIC | Age: 59
End: 2019-06-28
Payer: MEDICARE

## 2019-06-28 DIAGNOSIS — J30.9 ALLERGIC RHINITIS, UNSPECIFIED SEASONALITY, UNSPECIFIED TRIGGER: Primary | ICD-10-CM

## 2019-06-28 DIAGNOSIS — E03.4 HYPOTHYROIDISM DUE TO ACQUIRED ATROPHY OF THYROID: ICD-10-CM

## 2019-06-28 DIAGNOSIS — E03.1 CONGENITAL HYPOTHYROIDISM WITHOUT GOITER: Primary | ICD-10-CM

## 2019-06-28 DIAGNOSIS — K21.9 GASTROESOPHAGEAL REFLUX DISEASE WITHOUT ESOPHAGITIS: ICD-10-CM

## 2019-06-28 PROCEDURE — 95117 PR IMMU2THERAPY, 2+ INJECTIONS: ICD-10-PCS | Mod: S$GLB,,, | Performed by: SPECIALIST

## 2019-06-28 PROCEDURE — 95117 IMMUNOTHERAPY INJECTIONS: CPT | Mod: S$GLB,,, | Performed by: SPECIALIST

## 2019-06-28 RX ORDER — PANTOPRAZOLE SODIUM 40 MG/1
TABLET, DELAYED RELEASE ORAL
Qty: 90 TABLET | Refills: 0 | Status: SHIPPED | OUTPATIENT
Start: 2019-06-28

## 2019-06-28 RX ORDER — LEVOTHYROXINE SODIUM 88 UG/1
TABLET ORAL
Qty: 90 TABLET | Refills: 0 | Status: SHIPPED | OUTPATIENT
Start: 2019-06-28 | End: 2019-11-19 | Stop reason: SDUPTHER

## 2019-06-28 NOTE — TELEPHONE ENCOUNTER
Called patient in regards to rx notification and scheduling labs and an annual. No answer. Left voicemail to return phone call to clinic. Clinic number provided.

## 2019-06-28 NOTE — TELEPHONE ENCOUNTER
I placed what orders I thought you may like. Patient's last labs were 8/24/2017. Please add or remove whatever you feel is necessary. I will call to schedule both the labs and follow up appointment at the same time. Thank you in advance.

## 2019-06-28 NOTE — PROGRESS NOTES
Pt reports no reaction to last allergy injections. Pt denies feeling sick. She verified silver top allergy vials, 1:20. Injections given x2. Tolerated well.

## 2019-07-01 NOTE — TELEPHONE ENCOUNTER
2nd attempt to contact patient. Forwarded straight to voicemail. Left voicemail to let patient know she needs to be scheduled for an annual appointment and labs and to please call us to be scheduled. Clinic number provided.

## 2019-07-02 NOTE — TELEPHONE ENCOUNTER
3rd attempt to contact patient to schedule annual and labs. Forwarded straight to voicemail. Left message to please return phone call to clinic.

## 2019-08-08 ENCOUNTER — PES CALL (OUTPATIENT)
Dept: ADMINISTRATIVE | Facility: CLINIC | Age: 59
End: 2019-08-08

## 2019-08-08 ENCOUNTER — TELEPHONE (OUTPATIENT)
Dept: OTOLARYNGOLOGY | Facility: CLINIC | Age: 59
End: 2019-08-08

## 2019-08-08 NOTE — TELEPHONE ENCOUNTER
Returned pt call. LM on VM.       ----- Message from Tracy Rush sent at 8/8/2019  1:16 PM CDT -----  Contact: JOHN BENSON [[id number]]    Name of Who is Calling: JOHN BENSON [[id number]]       What is the request in detail: Patient is requesting a call from staff in regards to scheduling her injection for Wednesday at 1:00 or Friday at 11  .....Please contact to further discuss and advise.     Can the clinic reply by [facility]: no     What Number to Call Back if not in [facility]:  [phone]

## 2019-08-28 ENCOUNTER — PATIENT OUTREACH (OUTPATIENT)
Dept: ADMINISTRATIVE | Facility: HOSPITAL | Age: 59
End: 2019-08-28

## 2019-08-28 DIAGNOSIS — Z12.31 VISIT FOR SCREENING MAMMOGRAM: Primary | ICD-10-CM

## 2019-09-10 ENCOUNTER — TELEPHONE (OUTPATIENT)
Dept: FAMILY MEDICINE | Facility: CLINIC | Age: 59
End: 2019-09-10

## 2019-09-10 NOTE — TELEPHONE ENCOUNTER
----- Message from Frieda Whittington sent at 9/10/2019  1:29 PM CDT -----  Contact: self  Patient will like orders to have thyroid test on Thursday 09/12       Patient contact [phone] ([email or web address])

## 2019-11-14 ENCOUNTER — LAB VISIT (OUTPATIENT)
Dept: LAB | Facility: HOSPITAL | Age: 59
End: 2019-11-14
Attending: FAMILY MEDICINE
Payer: MEDICARE

## 2019-11-14 DIAGNOSIS — E03.4 HYPOTHYROIDISM DUE TO ACQUIRED ATROPHY OF THYROID: ICD-10-CM

## 2019-11-14 LAB
ALBUMIN SERPL BCP-MCNC: 4.2 G/DL (ref 3.5–5.2)
ALP SERPL-CCNC: 109 U/L (ref 55–135)
ALT SERPL W/O P-5'-P-CCNC: 20 U/L (ref 10–44)
ANION GAP SERPL CALC-SCNC: 11 MMOL/L (ref 8–16)
AST SERPL-CCNC: 25 U/L (ref 10–40)
BILIRUB SERPL-MCNC: 0.6 MG/DL (ref 0.1–1)
BUN SERPL-MCNC: 10 MG/DL (ref 6–20)
CALCIUM SERPL-MCNC: 9.1 MG/DL (ref 8.7–10.5)
CHLORIDE SERPL-SCNC: 107 MMOL/L (ref 95–110)
CHOLEST SERPL-MCNC: 227 MG/DL (ref 120–199)
CHOLEST/HDLC SERPL: 3.6 {RATIO} (ref 2–5)
CO2 SERPL-SCNC: 24 MMOL/L (ref 23–29)
CREAT SERPL-MCNC: 0.8 MG/DL (ref 0.5–1.4)
EST. GFR  (AFRICAN AMERICAN): >60 ML/MIN/1.73 M^2
EST. GFR  (NON AFRICAN AMERICAN): >60 ML/MIN/1.73 M^2
GLUCOSE SERPL-MCNC: 72 MG/DL (ref 70–110)
HDLC SERPL-MCNC: 63 MG/DL (ref 40–75)
HDLC SERPL: 27.8 % (ref 20–50)
LDLC SERPL CALC-MCNC: 106 MG/DL (ref 63–159)
NONHDLC SERPL-MCNC: 164 MG/DL
POTASSIUM SERPL-SCNC: 4 MMOL/L (ref 3.5–5.1)
PROT SERPL-MCNC: 7.6 G/DL (ref 6–8.4)
SODIUM SERPL-SCNC: 142 MMOL/L (ref 136–145)
TRIGL SERPL-MCNC: 290 MG/DL (ref 30–150)
TSH SERPL DL<=0.005 MIU/L-ACNC: 1.56 UIU/ML (ref 0.4–4)

## 2019-11-14 PROCEDURE — 36415 COLL VENOUS BLD VENIPUNCTURE: CPT | Mod: HCNC,PO

## 2019-11-14 PROCEDURE — 80061 LIPID PANEL: CPT | Mod: HCNC

## 2019-11-14 PROCEDURE — 80053 COMPREHEN METABOLIC PANEL: CPT | Mod: HCNC

## 2019-11-14 PROCEDURE — 84443 ASSAY THYROID STIM HORMONE: CPT | Mod: HCNC

## 2019-11-19 ENCOUNTER — OFFICE VISIT (OUTPATIENT)
Dept: FAMILY MEDICINE | Facility: CLINIC | Age: 59
End: 2019-11-19
Payer: MEDICARE

## 2019-11-19 VITALS
RESPIRATION RATE: 18 BRPM | DIASTOLIC BLOOD PRESSURE: 72 MMHG | BODY MASS INDEX: 26.52 KG/M2 | SYSTOLIC BLOOD PRESSURE: 112 MMHG | WEIGHT: 165 LBS | HEIGHT: 66 IN | HEART RATE: 86 BPM | OXYGEN SATURATION: 98 % | TEMPERATURE: 99 F

## 2019-11-19 DIAGNOSIS — G50.0 IDIOPATHIC TRIGEMINAL NEURALGIA: ICD-10-CM

## 2019-11-19 DIAGNOSIS — I70.0 ATHEROSCLEROSIS OF ABDOMINAL AORTA: ICD-10-CM

## 2019-11-19 DIAGNOSIS — D35.2 BENIGN NEOPLASM OF PITUITARY GLAND: ICD-10-CM

## 2019-11-19 DIAGNOSIS — Z00.00 ROUTINE GENERAL MEDICAL EXAMINATION AT A HEALTH CARE FACILITY: Primary | ICD-10-CM

## 2019-11-19 DIAGNOSIS — E03.4 HYPOTHYROIDISM DUE TO ACQUIRED ATROPHY OF THYROID: ICD-10-CM

## 2019-11-19 PROCEDURE — G0008 FLU VACCINE (QUAD) GREATER THAN OR EQUAL TO 3YO PRESERVATIVE FREE IM: ICD-10-PCS | Mod: HCNC,S$GLB,, | Performed by: FAMILY MEDICINE

## 2019-11-19 PROCEDURE — 99999 PR PBB SHADOW E&M-EST. PATIENT-LVL III: CPT | Mod: PBBFAC,HCNC,, | Performed by: FAMILY MEDICINE

## 2019-11-19 PROCEDURE — 90686 IIV4 VACC NO PRSV 0.5 ML IM: CPT | Mod: HCNC,S$GLB,, | Performed by: FAMILY MEDICINE

## 2019-11-19 PROCEDURE — 99396 PREV VISIT EST AGE 40-64: CPT | Mod: HCNC,25,S$GLB, | Performed by: FAMILY MEDICINE

## 2019-11-19 PROCEDURE — 99999 PR PBB SHADOW E&M-EST. PATIENT-LVL III: ICD-10-PCS | Mod: PBBFAC,HCNC,, | Performed by: FAMILY MEDICINE

## 2019-11-19 PROCEDURE — 90686 FLU VACCINE (QUAD) GREATER THAN OR EQUAL TO 3YO PRESERVATIVE FREE IM: ICD-10-PCS | Mod: HCNC,S$GLB,, | Performed by: FAMILY MEDICINE

## 2019-11-19 PROCEDURE — G0008 ADMIN INFLUENZA VIRUS VAC: HCPCS | Mod: HCNC,S$GLB,, | Performed by: FAMILY MEDICINE

## 2019-11-19 PROCEDURE — 99396 PR PREVENTIVE VISIT,EST,40-64: ICD-10-PCS | Mod: HCNC,25,S$GLB, | Performed by: FAMILY MEDICINE

## 2019-11-19 RX ORDER — LISDEXAMFETAMINE DIMESYLATE 70 MG/1
70 CAPSULE ORAL EVERY MORNING
Refills: 0 | COMMUNITY
Start: 2019-11-16 | End: 2023-09-12

## 2019-11-19 RX ORDER — LEVOTHYROXINE SODIUM 88 UG/1
88 TABLET ORAL DAILY
Qty: 90 TABLET | Refills: 3 | Status: SHIPPED | OUTPATIENT
Start: 2019-11-19 | End: 2020-11-02 | Stop reason: SDUPTHER

## 2019-11-19 NOTE — PROGRESS NOTES
Subjective:       Patient ID: Nena Craig is a 59 y.o. female.    Chief Complaint: Follow-up (flu shot and refills on medicines; c/o dry and brittle hair and falling out; c/o both feet burning and restless leg syndrome )      Nena Craig is in the office for annual exam.    HPI  No updates to medical hx.  Past Medical History:   Diagnosis Date    ADHD (attention deficit hyperactivity disorder)     Allergic rhinitis due to pollen 8/6/2014    Anxiety     Diverticulitis     Hypothyroidism     Perforated diverticulum of duodenum     Trigeminal neuralgia of left side of face      She's noticing some issues with burning in the feet when lying down, R=L, approx 1 year of sx. She does note waxing/waning sx. Recalls that she had a fall onto her R lower back. She does have f/u scheduled with neuro/disouza. Takes gabapentin already for cervical syndrome.     Current Outpatient Medications:     clonazePAM (KLONOPIN) 0.5 MG tablet, Take 0.5 mg by mouth 3 (three) times daily., Disp: , Rfl:     COCONUT OIL ORAL, Take by mouth., Disp: , Rfl:     fluticasone (FLONASE) 50 mcg/actuation nasal spray, 2 sprays (100 mcg total) by Each Nare route once daily., Disp: 1 Bottle, Rfl: 11    gabapentin (NEURONTIN) 600 MG tablet, Take 1 tablet (600 mg total) by mouth 3 (three) times daily., Disp: 270 tablet, Rfl: 3    ipratropium (ATROVENT) 0.03 % nasal spray, 2 sprays by Nasal route 2 (two) times daily. May be use more often if needed, Disp: 30 mL, Rfl: 11    Lactobacillus rhamnosus GG (CULTURELLE) 10 billion cell capsule, Take 1 capsule by mouth once daily., Disp: , Rfl:     levothyroxine (SYNTHROID) 88 MCG tablet, TAKE 1 TABLET EVERY DAY, Disp: 90 tablet, Rfl: 0    loratadine (CLARITIN) 10 mg tablet, Take 1 tablet (10 mg total) by mouth once daily., Disp: 90 tablet, Rfl: 1    metronidazole 1% (METROGEL) 1 % Gel, Apply topically once daily., Disp: 60 g, Rfl: 0    pantoprazole (PROTONIX) 40 MG tablet, TAKE 1 TABLET  EVERY DAY, Disp: 90 tablet, Rfl: 0    VYVANSE 70 mg capsule, Take 70 mg by mouth every morning., Disp: , Rfl: 0    The 10-year ASCVD risk score ([place] GRIS Jr., et al., 2013) is: 2.6%    Values used to calculate the score:      Age: 59 years      Sex: Female      Is Non- : No      Diabetic: No      Tobacco smoker: No      Systolic Blood Pressure: 112 mmHg      Is BP treated: No      HDL Cholesterol: 63 mg/dL      Total Cholesterol: 227 mg/dL     Lab Results   Component Value Date    HGBA1C 5.3 08/24/2017     Lab Results   Component Value Date    LDLCALC 106.0 11/14/2019    CREATININE 0.8 11/14/2019   labs 2019 rev.    Review of Systems   Constitutional: Negative for activity change, fatigue and unexpected weight change.   HENT: Positive for sinus pressure. Negative for congestion, ear discharge, facial swelling, postnasal drip, rhinorrhea, sore throat, tinnitus and trouble swallowing.         Uses allegra and decongestants.   Eyes: Negative for photophobia and visual disturbance.   Respiratory: Negative for cough, chest tightness and shortness of breath.    Cardiovascular: Negative for chest pain, palpitations and leg swelling.   Gastrointestinal: Positive for constipation. Negative for abdominal pain, blood in stool, diarrhea and nausea.        Some gerd.   Genitourinary: Negative for difficulty urinating, dysuria, hematuria and urgency.   Musculoskeletal: Negative for arthralgias, back pain, gait problem, joint swelling and myalgias.        No formal exercise current.   Skin: Negative for rash and wound.        Has rosacea, uses metrogel.some hair thinning.    Neurological: Positive for headaches. Negative for dizziness, light-headedness and numbness.   Psychiatric/Behavioral: Negative for decreased concentration, dysphoric mood and sleep disturbance. The patient is not nervous/anxious.        Objective:      Physical Exam   Constitutional: She is oriented to person, place, and time. She  appears well-developed and well-nourished. No distress.   HENT:   Head: Normocephalic and atraumatic.   Right Ear: External ear normal.   Left Ear: External ear normal.   Nose: Nose normal.   Mouth/Throat: Oropharynx is clear and moist. No oropharyngeal exudate.   Eyes: Pupils are equal, round, and reactive to light. Conjunctivae and EOM are normal.   Neck: Normal range of motion. Neck supple. No thyromegaly present.   Cardiovascular: Normal rate and regular rhythm.   Pulmonary/Chest: Effort normal and breath sounds normal. No respiratory distress. She has no wheezes.   Abdominal: Soft. Bowel sounds are normal. She exhibits no distension and no mass. There is no tenderness. There is no rebound and no guarding.   Musculoskeletal: Normal range of motion. She exhibits no edema.   Lymphadenopathy:     She has no cervical adenopathy.   Neurological: She is alert and oriented to person, place, and time.   Skin: Skin is warm and dry.   Psychiatric: She has a normal mood and affect. Her behavior is normal.   Nursing note and vitals reviewed.          Screening recommendations appropriate to age and health status were reviewed.    Assessment & Plan:    Routine general medical examination at a health care facility  Anticipatory guidanc reviewed.  Benign neoplasm of pituitary gland  Stable per pt with previous imaging.  Atherosclerosis of abdominal aorta  Not currently on asa/statin. To note, low ASCVD risk. Can discuss calcium score at f/u.  Idiopathic trigeminal neuralgia  Stable on regimen. Pt maintains neuro fu/.  Hypothyroidism due to acquired atrophy of thyroid  -     levothyroxine (SYNTHROID) 88 MCG tablet; Take 1 tablet (88 mcg total) by mouth once daily.  Dispense: 90 tablet; Refill: 3  Stable dose, to continue.  Other orders  -     Influenza - Quadrivalent (PF)

## 2019-11-26 ENCOUNTER — PATIENT OUTREACH (OUTPATIENT)
Dept: ADMINISTRATIVE | Facility: HOSPITAL | Age: 59
End: 2019-11-26

## 2020-01-24 ENCOUNTER — TELEPHONE (OUTPATIENT)
Dept: OPTOMETRY | Facility: CLINIC | Age: 60
End: 2020-01-24

## 2020-02-26 DIAGNOSIS — G50.0 IDIOPATHIC TRIGEMINAL NEURALGIA: ICD-10-CM

## 2020-02-26 RX ORDER — GABAPENTIN 600 MG/1
600 TABLET ORAL 3 TIMES DAILY
Qty: 270 TABLET | Refills: 0 | Status: SHIPPED | OUTPATIENT
Start: 2020-02-26 | End: 2020-07-20 | Stop reason: SDUPTHER

## 2020-02-26 NOTE — TELEPHONE ENCOUNTER
----- Message from Jeevan So sent at 2/26/2020 12:36 PM CST -----  Contact: pt  Type:  RX Refill Request    Who Called:  pt  Refill or New Rx:  refill  RX Name and Strength:  gabapentin (NEURONTIN) 600 MG tablet  How is the patient currently taking it? (ex. 1XDay):   Take 1 tablet (600 mg total) by mouth 3 (three) times daily. - Oral  Is this a 30 day or 90 day RX:  270 tablet  Preferred Pharmacy with phone number:    [facility] [address], [place] - [address]  [address]  [facility] [address]  Phone: [phone] Fax: [phone]    Local or Mail Order:  local  Ordering Provider:  Ryley Shine MD  Best Call Back Number:  [phone]  Additional Information:  Pt states that Ryley Shine MD passed away and would like to know if Dr. Monahan will fill this medication for her. Also, would like to discuss getting recommendations on a new neurologists. Please call to advise.

## 2020-02-26 NOTE — TELEPHONE ENCOUNTER
In Dr Monahan abscence, can you please address .   Pt was seeing neurologist for trigeminal neuralgia but he passed away . Pt is asking Dr Monahan to refill Gabapentin 600 mg tid until she can establish with new neurologist . Pt also asking who is a good neurologist that she can referred to . Pls advise.--lp

## 2020-02-28 NOTE — TELEPHONE ENCOUNTER
Called and spoke with pt. Pt was notified of rx refill and of recommend neurologists. Patient voiced understanding.

## 2020-07-20 DIAGNOSIS — G50.0 IDIOPATHIC TRIGEMINAL NEURALGIA: ICD-10-CM

## 2020-07-21 RX ORDER — GABAPENTIN 600 MG/1
600 TABLET ORAL 3 TIMES DAILY
Qty: 270 TABLET | Refills: 0 | Status: SHIPPED | OUTPATIENT
Start: 2020-07-21 | End: 2023-06-12

## 2020-10-05 ENCOUNTER — PATIENT MESSAGE (OUTPATIENT)
Dept: ADMINISTRATIVE | Facility: HOSPITAL | Age: 60
End: 2020-10-05

## 2020-11-02 DIAGNOSIS — E03.4 HYPOTHYROIDISM DUE TO ACQUIRED ATROPHY OF THYROID: ICD-10-CM

## 2020-11-03 RX ORDER — LEVOTHYROXINE SODIUM 88 UG/1
88 TABLET ORAL DAILY
Qty: 90 TABLET | Refills: 3 | Status: SHIPPED | OUTPATIENT
Start: 2020-11-03 | End: 2021-05-03

## 2021-01-04 ENCOUNTER — PATIENT MESSAGE (OUTPATIENT)
Dept: ADMINISTRATIVE | Facility: HOSPITAL | Age: 61
End: 2021-01-04

## 2021-04-06 ENCOUNTER — PATIENT MESSAGE (OUTPATIENT)
Dept: ADMINISTRATIVE | Facility: HOSPITAL | Age: 61
End: 2021-04-06

## 2021-05-02 DIAGNOSIS — E03.4 HYPOTHYROIDISM DUE TO ACQUIRED ATROPHY OF THYROID: ICD-10-CM

## 2021-05-03 RX ORDER — LEVOTHYROXINE SODIUM 88 UG/1
TABLET ORAL
Qty: 90 TABLET | Refills: 0 | Status: SHIPPED | OUTPATIENT
Start: 2021-05-03 | End: 2021-05-05 | Stop reason: SDUPTHER

## 2021-05-05 DIAGNOSIS — E03.4 HYPOTHYROIDISM DUE TO ACQUIRED ATROPHY OF THYROID: ICD-10-CM

## 2021-05-06 RX ORDER — LEVOTHYROXINE SODIUM 88 UG/1
88 TABLET ORAL DAILY
Qty: 90 TABLET | Refills: 0 | Status: SHIPPED | OUTPATIENT
Start: 2021-05-06 | End: 2021-08-18 | Stop reason: SDUPTHER

## 2021-07-07 ENCOUNTER — PATIENT MESSAGE (OUTPATIENT)
Dept: ADMINISTRATIVE | Facility: HOSPITAL | Age: 61
End: 2021-07-07

## 2021-11-11 ENCOUNTER — TELEPHONE (OUTPATIENT)
Dept: OTOLARYNGOLOGY | Facility: CLINIC | Age: 61
End: 2021-11-11
Payer: MEDICARE

## 2021-11-17 ENCOUNTER — TELEPHONE (OUTPATIENT)
Dept: OTOLARYNGOLOGY | Facility: CLINIC | Age: 61
End: 2021-11-17
Payer: MEDICARE

## 2021-12-06 ENCOUNTER — TELEPHONE (OUTPATIENT)
Dept: OTOLARYNGOLOGY | Facility: CLINIC | Age: 61
End: 2021-12-06
Payer: MEDICARE

## 2021-12-30 ENCOUNTER — IMMUNIZATION (OUTPATIENT)
Dept: FAMILY MEDICINE | Facility: CLINIC | Age: 61
End: 2021-12-30
Payer: MEDICARE

## 2021-12-30 DIAGNOSIS — Z23 NEED FOR VACCINATION: Primary | ICD-10-CM

## 2021-12-30 PROCEDURE — 91300 COVID-19, MRNA, LNP-S, PF, 30 MCG/0.3 ML DOSE VACCINE: CPT | Mod: PBBFAC,PO

## 2022-02-17 ENCOUNTER — TELEPHONE (OUTPATIENT)
Dept: FAMILY MEDICINE | Facility: CLINIC | Age: 62
End: 2022-02-17
Payer: COMMERCIAL

## 2022-02-17 DIAGNOSIS — E03.4 HYPOTHYROIDISM DUE TO ACQUIRED ATROPHY OF THYROID: ICD-10-CM

## 2022-02-17 DIAGNOSIS — Z12.31 ENCOUNTER FOR SCREENING MAMMOGRAM FOR BREAST CANCER: Primary | ICD-10-CM

## 2022-02-17 RX ORDER — LEVOTHYROXINE SODIUM 88 UG/1
88 TABLET ORAL DAILY
Qty: 30 TABLET | Refills: 1 | Status: SHIPPED | OUTPATIENT
Start: 2022-02-17 | End: 2022-04-21 | Stop reason: SDUPTHER

## 2022-02-17 NOTE — TELEPHONE ENCOUNTER
Spoke with pt and scheduled annual. Temp supply of levothyroxine requested. Order pended please advise.

## 2022-02-17 NOTE — TELEPHONE ENCOUNTER
----- Message from Mayela Arvizu sent at 2/17/2022  2:28 PM CST -----  Type: Needs Medical Advice  Who Called:  PT  Symptoms (please be specific):  Pt is asking for a refill on levothyroxine (SYNTHROID) 88 MCG tablet as well as get orders for Mammogram. She has not been seen since 2019    Best Call Back Number: [phone]  Additional Information: Please call and advise

## 2022-02-28 ENCOUNTER — PATIENT MESSAGE (OUTPATIENT)
Dept: ADMINISTRATIVE | Facility: HOSPITAL | Age: 62
End: 2022-02-28
Payer: COMMERCIAL

## 2022-02-28 ENCOUNTER — PATIENT OUTREACH (OUTPATIENT)
Dept: ADMINISTRATIVE | Facility: HOSPITAL | Age: 62
End: 2022-02-28
Payer: COMMERCIAL

## 2022-02-28 NOTE — PROGRESS NOTES
2022 Care Everywhere updates requested and reviewed.  Immunizations reconciled. Media reports reviewed.  Duplicate HM overrides and  orders removed.  Overdue HM topic chart audit and/or requested.  Overdue lab testing linked to upcoming lab appointments if applies.  Lab [facility], and [facility] reviewed   Lab testing     DIS reviewed      Mammogram  Mammogram scheduled 2022    Health Maintenance Due   Topic Date Due    HIV Screening  Never done    Shingles Vaccine (1 of 2) Never done    Mammogram  2019

## 2022-04-14 DIAGNOSIS — E03.4 HYPOTHYROIDISM DUE TO ACQUIRED ATROPHY OF THYROID: ICD-10-CM

## 2022-04-14 RX ORDER — LEVOTHYROXINE SODIUM 88 UG/1
TABLET ORAL
Qty: 30 TABLET | Refills: 0 | OUTPATIENT
Start: 2022-04-14

## 2022-04-14 NOTE — TELEPHONE ENCOUNTER
Refill Routing Note   Medication(s) are not appropriate for processing by [facility] for the following reason(s):      - Patient has not been seen in over 15 months by PCP  - Required laboratory values are outdated  - Patient has been seen in the ED/Hospital since the last PCP visit    [facility] action(s):  Defer          Medication reconciliation completed: No     Appointments  past 12m or future 3m with PCP    Date Provider   Last Visit   11/19/2019 Cleopatra Monahan MD   Next Visit   Visit date not found Cleopatra Monahan MD   ED visits in past 90 days: 0        Note composed:3:21 PM 04/14/2022

## 2022-04-21 PROBLEM — Z87.892 HISTORY OF ANAPHYLAXIS: Status: ACTIVE | Noted: 2022-04-21

## 2022-08-01 ENCOUNTER — OFFICE VISIT (OUTPATIENT)
Dept: OTOLARYNGOLOGY | Facility: CLINIC | Age: 62
End: 2022-08-01
Payer: COMMERCIAL

## 2022-08-01 VITALS
HEART RATE: 83 BPM | SYSTOLIC BLOOD PRESSURE: 140 MMHG | TEMPERATURE: 98 F | BODY MASS INDEX: 25.21 KG/M2 | DIASTOLIC BLOOD PRESSURE: 78 MMHG | WEIGHT: 160.94 LBS

## 2022-08-01 DIAGNOSIS — R60.0 PERIPHERAL EDEMA: ICD-10-CM

## 2022-08-01 DIAGNOSIS — J34.2 NASAL SEPTAL DEVIATION: ICD-10-CM

## 2022-08-01 DIAGNOSIS — J30.2 SEASONAL ALLERGIC RHINITIS, UNSPECIFIED TRIGGER: ICD-10-CM

## 2022-08-01 DIAGNOSIS — R60.0 EDEMA OF LEFT UPPER EXTREMITY: ICD-10-CM

## 2022-08-01 DIAGNOSIS — J30.9 ALLERGIC RHINITIS, UNSPECIFIED SEASONALITY, UNSPECIFIED TRIGGER: Primary | ICD-10-CM

## 2022-08-01 DIAGNOSIS — R60.0 EDEMA OF LEFT LOWER EXTREMITY: ICD-10-CM

## 2022-08-01 PROCEDURE — 99999 PR PBB SHADOW E&M-EST. PATIENT-LVL IV: CPT | Mod: PBBFAC,,, | Performed by: SPECIALIST

## 2022-08-01 PROCEDURE — 99203 PR OFFICE/OUTPT VISIT, NEW, LEVL III, 30-44 MIN: ICD-10-PCS | Mod: S$GLB,,, | Performed by: SPECIALIST

## 2022-08-01 PROCEDURE — 99203 OFFICE O/P NEW LOW 30 MIN: CPT | Mod: S$GLB,,, | Performed by: SPECIALIST

## 2022-08-01 PROCEDURE — 99999 PR PBB SHADOW E&M-EST. PATIENT-LVL IV: ICD-10-PCS | Mod: PBBFAC,,, | Performed by: SPECIALIST

## 2022-08-01 RX ORDER — FLUTICASONE PROPIONATE 50 MCG
2 SPRAY, SUSPENSION (ML) NASAL DAILY
Qty: 9.9 ML | Refills: 3 | Status: SHIPPED | OUTPATIENT
Start: 2022-08-01

## 2022-08-01 RX ORDER — IPRATROPIUM BROMIDE 21 UG/1
2 SPRAY, METERED NASAL 2 TIMES DAILY
Qty: 30 ML | Refills: 11 | Status: SHIPPED | OUTPATIENT
Start: 2022-08-01
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
## 2022-11-07 ENCOUNTER — OFFICE VISIT (OUTPATIENT)
Dept: OTOLARYNGOLOGY | Facility: CLINIC | Age: 62
End: 2022-11-07
Payer: COMMERCIAL

## 2022-11-07 VITALS
BODY MASS INDEX: 25.21 KG/M2 | WEIGHT: 160.94 LBS | TEMPERATURE: 98 F | SYSTOLIC BLOOD PRESSURE: 145 MMHG | DIASTOLIC BLOOD PRESSURE: 83 MMHG | HEART RATE: 82 BPM

## 2022-11-07 DIAGNOSIS — G50.0 IDIOPATHIC TRIGEMINAL NEURALGIA: ICD-10-CM

## 2022-11-07 DIAGNOSIS — R60.0 PERIPHERAL EDEMA: ICD-10-CM

## 2022-11-07 DIAGNOSIS — Z86.69 HISTORY OF MIGRAINE HEADACHES: ICD-10-CM

## 2022-11-07 DIAGNOSIS — J30.9 ALLERGIC RHINITIS, UNSPECIFIED SEASONALITY, UNSPECIFIED TRIGGER: Primary | ICD-10-CM

## 2022-11-07 DIAGNOSIS — J34.2 NASAL SEPTAL DEVIATION: ICD-10-CM

## 2022-11-07 DIAGNOSIS — R51.9 NONINTRACTABLE EPISODIC HEADACHE, UNSPECIFIED HEADACHE TYPE: ICD-10-CM

## 2022-11-07 PROCEDURE — 99214 OFFICE O/P EST MOD 30 MIN: CPT | Mod: S$GLB,,, | Performed by: SPECIALIST

## 2022-11-07 PROCEDURE — 99999 PR PBB SHADOW E&M-EST. PATIENT-LVL III: ICD-10-PCS | Mod: PBBFAC,,, | Performed by: SPECIALIST

## 2022-11-07 PROCEDURE — 99999 PR PBB SHADOW E&M-EST. PATIENT-LVL III: CPT | Mod: PBBFAC,,, | Performed by: SPECIALIST

## 2022-11-07 PROCEDURE — 99214 PR OFFICE/OUTPT VISIT, EST, LEVL IV, 30-39 MIN: ICD-10-PCS | Mod: S$GLB,,, | Performed by: SPECIALIST

## 2024-06-24 ENCOUNTER — TELEPHONE (OUTPATIENT)
Dept: FAMILY MEDICINE | Facility: CLINIC | Age: 64
End: 2024-06-24

## 2024-06-24 NOTE — TELEPHONE ENCOUNTER
Contacted pt to see if she wanted to establish care with Dr. Monahan again for medication refill. Patient hasn't been seen since 2019. Patient did not , left vm. Also sent a message to mychart msg.

## 2024-07-29 ENCOUNTER — HOSPITAL ENCOUNTER (OUTPATIENT)
Dept: RADIOLOGY | Facility: HOSPITAL | Age: 64
Discharge: HOME | End: 2024-07-29
Payer: MEDICARE

## 2024-07-29 DIAGNOSIS — Z12.31 ENCOUNTER FOR SCREENING MAMMOGRAM FOR BREAST CANCER: ICD-10-CM

## 2024-07-29 PROCEDURE — 77067 SCR MAMMO BI INCL CAD: CPT | Mod: 26,,, | Performed by: RADIOLOGY

## 2024-07-29 PROCEDURE — 77063 BREAST TOMOSYNTHESIS BI: CPT | Mod: 26,,, | Performed by: RADIOLOGY

## 2024-07-29 PROCEDURE — 77067 SCR MAMMO BI INCL CAD: CPT | Mod: TC,PO

## 2025-04-22 ENCOUNTER — TELEPHONE (OUTPATIENT)
Dept: GASTROENTEROLOGY | Facility: CLINIC | Age: 65
End: 2025-04-22
Payer: MEDICARE

## 2025-04-22 NOTE — TELEPHONE ENCOUNTER
----- Message from Cherise sent at 4/22/2025  3:59 PM CDT -----  Type: Appointment RequestCaller is requesting appointment.  Name of Caller:Xuan is the first available appointment?naSymptoms:colonoscopyWould the patient rather a call back or a response via [facility]? Call Nilo Call Back Number:[phone] Information: Monday, Tuesday and Thursday are her off days     Please call Back to advise. Thanks!

## 2025-06-12 PROBLEM — K59.01 SLOW TRANSIT CONSTIPATION: Status: ACTIVE | Noted: 2025-06-12

## 2025-06-12 PROBLEM — R10.31 RIGHT GROIN PAIN: Status: ACTIVE | Noted: 2025-06-12

## 2025-06-12 PROBLEM — B02.29 NEURALGIA, POSTHERPETIC: Status: ACTIVE | Noted: 2025-06-12

## 2025-07-10 PROBLEM — H61.21 IMPACTED CERUMEN OF RIGHT EAR: Status: ACTIVE | Noted: 2025-07-10

## 2025-07-15 ENCOUNTER — PATIENT MESSAGE (OUTPATIENT)
Dept: OTOLARYNGOLOGY | Facility: CLINIC | Age: 65
End: 2025-07-15
Payer: MEDICARE